# Patient Record
Sex: FEMALE | Race: BLACK OR AFRICAN AMERICAN | NOT HISPANIC OR LATINO | Employment: FULL TIME | URBAN - METROPOLITAN AREA
[De-identification: names, ages, dates, MRNs, and addresses within clinical notes are randomized per-mention and may not be internally consistent; named-entity substitution may affect disease eponyms.]

---

## 2024-06-07 ENCOUNTER — APPOINTMENT (OUTPATIENT)
Dept: PRIMARY CARE | Facility: CLINIC | Age: 24
End: 2024-06-07

## 2024-06-28 ENCOUNTER — APPOINTMENT (OUTPATIENT)
Dept: OBSTETRICS AND GYNECOLOGY | Facility: CLINIC | Age: 24
End: 2024-06-28
Payer: COMMERCIAL

## 2024-10-09 ASSESSMENT — ENCOUNTER SYMPTOMS
HEMATURIA: 0
FLATUS: 0
BELCHING: 1
FREQUENCY: 1
FEVER: 0
VOMITING: 0
ABDOMINAL PAIN: 1
DYSURIA: 0
HEADACHES: 1
ARTHRALGIAS: 1
DIARRHEA: 0
WEIGHT LOSS: 0
NAUSEA: 1
CONSTIPATION: 0
MYALGIAS: 0
HEMATOCHEZIA: 0
ANOREXIA: 1

## 2024-10-10 ENCOUNTER — HOSPITAL ENCOUNTER (EMERGENCY)
Facility: HOSPITAL | Age: 24
Discharge: HOME | End: 2024-10-10
Payer: MEDICAID

## 2024-10-10 ENCOUNTER — APPOINTMENT (OUTPATIENT)
Dept: RADIOLOGY | Facility: HOSPITAL | Age: 24
End: 2024-10-10
Payer: MEDICAID

## 2024-10-10 VITALS
OXYGEN SATURATION: 98 % | TEMPERATURE: 98.2 F | HEART RATE: 85 BPM | HEIGHT: 62 IN | DIASTOLIC BLOOD PRESSURE: 80 MMHG | WEIGHT: 145 LBS | SYSTOLIC BLOOD PRESSURE: 119 MMHG | RESPIRATION RATE: 16 BRPM | BODY MASS INDEX: 26.68 KG/M2

## 2024-10-10 DIAGNOSIS — M54.6 ACUTE LEFT-SIDED THORACIC BACK PAIN: Primary | ICD-10-CM

## 2024-10-10 LAB — PREGNANCY TEST URINE, POC: NEGATIVE

## 2024-10-10 PROCEDURE — 73030 X-RAY EXAM OF SHOULDER: CPT | Mod: LT

## 2024-10-10 PROCEDURE — 2500000001 HC RX 250 WO HCPCS SELF ADMINISTERED DRUGS (ALT 637 FOR MEDICARE OP): Mod: SE

## 2024-10-10 PROCEDURE — 99284 EMERGENCY DEPT VISIT MOD MDM: CPT

## 2024-10-10 PROCEDURE — 2500000005 HC RX 250 GENERAL PHARMACY W/O HCPCS: Mod: SE

## 2024-10-10 PROCEDURE — 72072 X-RAY EXAM THORAC SPINE 3VWS: CPT

## 2024-10-10 PROCEDURE — 73030 X-RAY EXAM OF SHOULDER: CPT | Mod: LEFT SIDE | Performed by: RADIOLOGY

## 2024-10-10 PROCEDURE — 72072 X-RAY EXAM THORAC SPINE 3VWS: CPT | Mod: FOREIGN READ | Performed by: RADIOLOGY

## 2024-10-10 PROCEDURE — 81025 URINE PREGNANCY TEST: CPT

## 2024-10-10 RX ORDER — METHOCARBAMOL 500 MG/1
1000 TABLET, FILM COATED ORAL ONCE
Status: COMPLETED | OUTPATIENT
Start: 2024-10-10 | End: 2024-10-10

## 2024-10-10 RX ORDER — METHOCARBAMOL 500 MG/1
500 TABLET, FILM COATED ORAL 3 TIMES DAILY
Qty: 9 TABLET | Refills: 0 | Status: SHIPPED | OUTPATIENT
Start: 2024-10-10 | End: 2024-10-22 | Stop reason: WASHOUT

## 2024-10-10 RX ORDER — ACETAMINOPHEN 325 MG/1
650 TABLET ORAL EVERY 6 HOURS PRN
Qty: 20 TABLET | Refills: 0 | Status: SHIPPED | OUTPATIENT
Start: 2024-10-10 | End: 2024-10-22 | Stop reason: WASHOUT

## 2024-10-10 RX ORDER — LIDOCAINE 560 MG/1
1 PATCH PERCUTANEOUS; TOPICAL; TRANSDERMAL ONCE
Status: DISCONTINUED | OUTPATIENT
Start: 2024-10-10 | End: 2024-10-10 | Stop reason: HOSPADM

## 2024-10-10 RX ORDER — IBUPROFEN 400 MG/1
400 TABLET ORAL ONCE
Status: COMPLETED | OUTPATIENT
Start: 2024-10-10 | End: 2024-10-10

## 2024-10-10 RX ORDER — IBUPROFEN 600 MG/1
600 TABLET ORAL EVERY 6 HOURS PRN
Qty: 16 TABLET | Refills: 0 | Status: SHIPPED | OUTPATIENT
Start: 2024-10-10 | End: 2024-10-14

## 2024-10-10 RX ORDER — ACETAMINOPHEN 325 MG/1
975 TABLET ORAL ONCE
Status: COMPLETED | OUTPATIENT
Start: 2024-10-10 | End: 2024-10-10

## 2024-10-10 RX ADMIN — METHOCARBAMOL 1000 MG: 500 TABLET ORAL at 09:59

## 2024-10-10 RX ADMIN — IBUPROFEN 400 MG: 400 TABLET ORAL at 09:59

## 2024-10-10 RX ADMIN — ACETAMINOPHEN 975 MG: 325 TABLET ORAL at 09:59

## 2024-10-10 RX ADMIN — LIDOCAINE 1 PATCH: 4 PATCH TOPICAL at 10:01

## 2024-10-10 ASSESSMENT — PAIN SCALES - GENERAL
PAINLEVEL_OUTOF10: 6
PAINLEVEL_OUTOF10: 8
PAINLEVEL_OUTOF10: 8
PAINLEVEL_OUTOF10: 7

## 2024-10-10 ASSESSMENT — PAIN DESCRIPTION - ORIENTATION: ORIENTATION: LEFT;UPPER

## 2024-10-10 ASSESSMENT — PAIN DESCRIPTION - LOCATION: LOCATION: BACK

## 2024-10-10 ASSESSMENT — PAIN - FUNCTIONAL ASSESSMENT
PAIN_FUNCTIONAL_ASSESSMENT: 0-10
PAIN_FUNCTIONAL_ASSESSMENT: 0-10

## 2024-10-10 ASSESSMENT — COLUMBIA-SUICIDE SEVERITY RATING SCALE - C-SSRS
6. HAVE YOU EVER DONE ANYTHING, STARTED TO DO ANYTHING, OR PREPARED TO DO ANYTHING TO END YOUR LIFE?: NO
1. IN THE PAST MONTH, HAVE YOU WISHED YOU WERE DEAD OR WISHED YOU COULD GO TO SLEEP AND NOT WAKE UP?: NO
2. HAVE YOU ACTUALLY HAD ANY THOUGHTS OF KILLING YOURSELF?: NO

## 2024-10-10 ASSESSMENT — PAIN DESCRIPTION - FREQUENCY: FREQUENCY: CONSTANT/CONTINUOUS

## 2024-10-10 ASSESSMENT — PAIN DESCRIPTION - PROGRESSION: CLINICAL_PROGRESSION: GRADUALLY WORSENING

## 2024-10-10 ASSESSMENT — PAIN DESCRIPTION - DESCRIPTORS: DESCRIPTORS: ACHING
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
## 2024-10-11 ENCOUNTER — OFFICE VISIT (OUTPATIENT)
Dept: OBSTETRICS AND GYNECOLOGY | Facility: CLINIC | Age: 24
End: 2024-10-11
Payer: MEDICAID

## 2024-10-11 VITALS
HEIGHT: 62 IN | HEART RATE: 97 BPM | DIASTOLIC BLOOD PRESSURE: 76 MMHG | BODY MASS INDEX: 28.01 KG/M2 | WEIGHT: 152.2 LBS | SYSTOLIC BLOOD PRESSURE: 116 MMHG

## 2024-10-11 DIAGNOSIS — D69.9 BLEEDING DISORDER (CMS-HCC): ICD-10-CM

## 2024-10-11 DIAGNOSIS — N94.6 DYSMENORRHEA: ICD-10-CM

## 2024-10-11 DIAGNOSIS — N92.1 MENORRHAGIA WITH IRREGULAR CYCLE: Primary | ICD-10-CM

## 2024-10-11 PROCEDURE — RXMED WILLOW AMBULATORY MEDICATION CHARGE

## 2024-10-11 PROCEDURE — 99213 OFFICE O/P EST LOW 20 MIN: CPT | Performed by: OBSTETRICS & GYNECOLOGY

## 2024-10-11 RX ORDER — FLUTICASONE PROPIONATE 50 MCG
1 SPRAY, SUSPENSION (ML) NASAL DAILY
COMMUNITY
Start: 2021-07-07

## 2024-10-11 RX ORDER — NORETHINDRONE 5 MG/1
5 TABLET ORAL DAILY
Qty: 90 TABLET | Refills: 0 | Status: SHIPPED | OUTPATIENT
Start: 2024-10-11 | End: 2025-01-15

## 2024-10-11 ASSESSMENT — ENCOUNTER SYMPTOMS
ENDOCRINE NEGATIVE: 0
ALLERGIC/IMMUNOLOGIC NEGATIVE: 0
RESPIRATORY NEGATIVE: 0
HEMATOLOGIC/LYMPHATIC NEGATIVE: 0
MUSCULOSKELETAL NEGATIVE: 0
PSYCHIATRIC NEGATIVE: 0
NEUROLOGICAL NEGATIVE: 0
EYES NEGATIVE: 0
CONSTITUTIONAL NEGATIVE: 0
GASTROINTESTINAL NEGATIVE: 0
CARDIOVASCULAR NEGATIVE: 0

## 2024-10-11 ASSESSMENT — PATIENT HEALTH QUESTIONNAIRE - PHQ9
1. LITTLE INTEREST OR PLEASURE IN DOING THINGS: NOT AT ALL
SUM OF ALL RESPONSES TO PHQ9 QUESTIONS 1 AND 2: 1
2. FEELING DOWN, DEPRESSED OR HOPELESS: SEVERAL DAYS
10. IF YOU CHECKED OFF ANY PROBLEMS, HOW DIFFICULT HAVE THESE PROBLEMS MADE IT FOR YOU TO DO YOUR WORK, TAKE CARE OF THINGS AT HOME, OR GET ALONG WITH OTHER PEOPLE: NOT DIFFICULT AT ALL

## 2024-10-11 ASSESSMENT — PAIN SCALES - GENERAL: PAINLEVEL: 3

## 2024-10-11 NOTE — PROGRESS NOTES
"        Clifford Mandujano presents today with:   Dysmenorrhea  Discussed potential diagnoses, including endometriosis  Reviewed pathophysiology of condition, how we diagnose treatment options, indications for surgery  Deisres to use norethindrone acetate now  We discussed Orilissa as well, declines at this time  Referral to pelvic floor PT  [facility]    Heavy menstrual bleeding   Workup for bleeding disorder due to having always had heavy periods          Prep time on date of patient encounter:  2  Time spent directly with patient/family/caregiver:  30  Coordination of care, ordering of labs, prescriptions:  5  Documentation time: 3  Total time on date of patient encounter: 40          -----------------------------------------------------------------------  HPI    Presents today with  Concern about having fibroids - has strong family history;   When was at school in [place], had CT scan which suggested fibroids  also with Painful periods, getting worse  Every since menarche, cramping, throwing up, can't move  Heating pads, exercising during, pain pills, midol, tylenol, advil, aleve  Even tried taking  Periods were predictable, now more irregular   Was prescribed birth control pills, took during college  Had spotting, still had pain  Started on patch, stopped taking last month - didn't feel a change in periods  Now has Pain all the time    Heavy bleeding - wearing large pads/diapers to avoid bleeding through    No pain with bowel movements  Not sexually active        Visit Vitals  /76   Pulse 97   Ht 1.575 m (5' 2\")   Wt 69 kg (152 lb 3.2 oz)   LMP 10/05/2024   BMI 27.84 kg/m²   OB Status Having periods   Smoking Status Never   BSA 1.74 m²       Physical Exam  Constitutional:       Appearance: Normal appearance.   HENT:      Head: Normocephalic and atraumatic.   Pulmonary:      Effort: Pulmonary effort is normal.   Musculoskeletal:      Cervical back: Neck supple.   Neurological:      " Mental Status: She is alert.   Psychiatric:         Mood and Affect: Mood normal.       Declined pelvic exam today

## 2024-10-17 ENCOUNTER — APPOINTMENT (OUTPATIENT)
Dept: RADIOLOGY | Facility: HOSPITAL | Age: 24
End: 2024-10-17
Payer: COMMERCIAL

## 2024-10-17 ENCOUNTER — APPOINTMENT (OUTPATIENT)
Dept: RADIOLOGY | Facility: HOSPITAL | Age: 24
End: 2024-10-17

## 2024-10-17 ENCOUNTER — PHARMACY VISIT (OUTPATIENT)
Dept: PHARMACY | Facility: CLINIC | Age: 24
End: 2024-10-17
Payer: MEDICARE

## 2024-10-22 ENCOUNTER — APPOINTMENT (OUTPATIENT)
Dept: PRIMARY CARE | Facility: CLINIC | Age: 24
End: 2024-10-22
Payer: MEDICAID

## 2024-10-22 VITALS
DIASTOLIC BLOOD PRESSURE: 80 MMHG | WEIGHT: 155 LBS | BODY MASS INDEX: 28.52 KG/M2 | OXYGEN SATURATION: 100 % | HEIGHT: 62 IN | SYSTOLIC BLOOD PRESSURE: 122 MMHG | HEART RATE: 88 BPM | TEMPERATURE: 97 F
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
## 2024-10-22 DIAGNOSIS — J30.2 SEASONAL ALLERGIES: Primary | ICD-10-CM

## 2024-10-22 DIAGNOSIS — J45.20 MILD INTERMITTENT ASTHMA, UNSPECIFIED WHETHER COMPLICATED (HHS-HCC): ICD-10-CM

## 2024-10-22 PROCEDURE — 3008F BODY MASS INDEX DOCD: CPT

## 2024-10-22 PROCEDURE — 99203 OFFICE O/P NEW LOW 30 MIN: CPT

## 2024-10-22 PROCEDURE — RXMED WILLOW AMBULATORY MEDICATION CHARGE

## 2024-10-22 RX ORDER — CETIRIZINE HYDROCHLORIDE 10 MG/1
10 TABLET ORAL DAILY
Qty: 30 TABLET | Refills: 2 | Status: SHIPPED | OUTPATIENT
Start: 2024-10-22 | End: 2025-01-20

## 2024-10-22 RX ORDER — ALBUTEROL SULFATE 90 UG/1
2 INHALANT RESPIRATORY (INHALATION) EVERY 4 HOURS PRN
Qty: 8.5 G | Refills: 5 | Status: SHIPPED | OUTPATIENT
Start: 2024-10-22 | End: 2025-10-22

## 2024-10-22 RX ORDER — FLUTICASONE PROPIONATE 50 MCG
1 SPRAY, SUSPENSION (ML) NASAL DAILY
Qty: 16 G | Refills: 5 | Status: SHIPPED | OUTPATIENT
Start: 2024-10-22 | End: 2025-10-22

## 2024-10-22 ASSESSMENT — PAIN SCALES - GENERAL: PAINLEVEL_OUTOF10: 0-NO PAIN

## 2024-10-22 ASSESSMENT — PATIENT HEALTH QUESTIONNAIRE - PHQ9
1. LITTLE INTEREST OR PLEASURE IN DOING THINGS: NOT AT ALL
SUM OF ALL RESPONSES TO PHQ9 QUESTIONS 1 AND 2: 0
2. FEELING DOWN, DEPRESSED OR HOPELESS: NOT AT ALL
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
## 2024-10-24 PROBLEM — J30.2 SEASONAL ALLERGIES: Status: ACTIVE | Noted: 2024-10-24

## 2024-10-29 ENCOUNTER — HOSPITAL ENCOUNTER (OUTPATIENT)
Dept: RADIOLOGY | Facility: HOSPITAL | Age: 24
Discharge: HOME | End: 2024-10-29
Payer: MEDICAID

## 2024-10-29 DIAGNOSIS — N92.1 MENORRHAGIA WITH IRREGULAR CYCLE: ICD-10-CM

## 2024-10-29 PROCEDURE — 76856 US EXAM PELVIC COMPLETE: CPT

## 2024-10-29 PROCEDURE — 76830 TRANSVAGINAL US NON-OB: CPT | Performed by: STUDENT IN AN ORGANIZED HEALTH CARE EDUCATION/TRAINING PROGRAM

## 2024-10-29 PROCEDURE — 76856 US EXAM PELVIC COMPLETE: CPT | Performed by: STUDENT IN AN ORGANIZED HEALTH CARE EDUCATION/TRAINING PROGRAM

## 2024-10-30 ENCOUNTER — LAB (OUTPATIENT)
Dept: LAB | Facility: LAB | Age: 24
End: 2024-10-30
Payer: MEDICAID

## 2024-10-30 DIAGNOSIS — D69.9 BLEEDING DISORDER (CMS-HCC): ICD-10-CM

## 2024-10-30 LAB
APTT PPP: 28 SECONDS (ref 27–38)
ERYTHROCYTE [DISTWIDTH] IN BLOOD BY AUTOMATED COUNT: 17.6 % (ref 11.5–14.5)
FIBRINOGEN PPP-MCNC: 341 MG/DL (ref 200–400)
HCT VFR BLD AUTO: 29.3 % (ref 36–46)
HGB BLD-MCNC: 9 G/DL (ref 12–16)
INR PPP: 1 (ref 0.9–1.1)
MCH RBC QN AUTO: 24.3 PG (ref 26–34)
MCHC RBC AUTO-ENTMCNC: 30.7 G/DL (ref 32–36)
MCV RBC AUTO: 79 FL (ref 80–100)
NRBC BLD-RTO: 0 /100 WBCS (ref 0–0)
PLATELET # BLD AUTO: 400 X10*3/UL (ref 150–450)
PROTHROMBIN TIME: 11.5 SECONDS (ref 9.8–12.8)
RBC # BLD AUTO: 3.7 X10*6/UL (ref 4–5.2)
WBC # BLD AUTO: 5.5 X10*3/UL (ref 4.4–11.3)

## 2024-10-30 PROCEDURE — 85610 PROTHROMBIN TIME: CPT

## 2024-10-30 PROCEDURE — 85730 THROMBOPLASTIN TIME PARTIAL: CPT

## 2024-10-30 PROCEDURE — 36415 COLL VENOUS BLD VENIPUNCTURE: CPT

## 2024-10-30 PROCEDURE — 85384 FIBRINOGEN ACTIVITY: CPT

## 2024-10-30 PROCEDURE — 85027 COMPLETE CBC AUTOMATED: CPT

## 2024-11-01 ENCOUNTER — PHARMACY VISIT (OUTPATIENT)
Dept: PHARMACY | Facility: CLINIC | Age: 24
End: 2024-11-01
Payer: MEDICAID

## 2024-11-13 ENCOUNTER — APPOINTMENT (OUTPATIENT)
Dept: URGENT CARE | Age: 24
End: 2024-11-13
Payer: COMMERCIAL

## 2024-11-19 ENCOUNTER — HOSPITAL ENCOUNTER (EMERGENCY)
Facility: HOSPITAL | Age: 24
Discharge: HOME | End: 2024-11-19
Payer: MEDICAID

## 2024-11-19 ENCOUNTER — APPOINTMENT (OUTPATIENT)
Dept: RADIOLOGY | Facility: HOSPITAL | Age: 24
End: 2024-11-19
Payer: MEDICAID

## 2024-11-19 VITALS
HEART RATE: 99 BPM | RESPIRATION RATE: 20 BRPM | BODY MASS INDEX: 27.6 KG/M2 | HEIGHT: 62 IN | DIASTOLIC BLOOD PRESSURE: 74 MMHG | SYSTOLIC BLOOD PRESSURE: 119 MMHG | TEMPERATURE: 98.2 F | WEIGHT: 150 LBS | OXYGEN SATURATION: 99 %

## 2024-11-19 DIAGNOSIS — D21.9 FIBROIDS: ICD-10-CM

## 2024-11-19 DIAGNOSIS — R10.9 ABDOMINAL PAIN, UNSPECIFIED ABDOMINAL LOCATION: Primary | ICD-10-CM

## 2024-11-19 LAB
ALBUMIN SERPL BCP-MCNC: 4.2 G/DL (ref 3.4–5)
ALP SERPL-CCNC: 47 U/L (ref 33–110)
ALT SERPL W P-5'-P-CCNC: 6 U/L (ref 7–45)
ANION GAP SERPL CALC-SCNC: 12 MMOL/L (ref 10–20)
AST SERPL W P-5'-P-CCNC: 11 U/L (ref 9–39)
BASOPHILS # BLD AUTO: 0.03 X10*3/UL (ref 0–0.1)
BASOPHILS NFR BLD AUTO: 0.5 %
BILIRUB SERPL-MCNC: 0.4 MG/DL (ref 0–1.2)
BUN SERPL-MCNC: 8 MG/DL (ref 6–23)
CALCIUM SERPL-MCNC: 9.5 MG/DL (ref 8.6–10.6)
CHLORIDE SERPL-SCNC: 106 MMOL/L (ref 98–107)
CO2 SERPL-SCNC: 25 MMOL/L (ref 21–32)
CREAT SERPL-MCNC: 0.86 MG/DL (ref 0.5–1.05)
EGFRCR SERPLBLD CKD-EPI 2021: >90 ML/MIN/1.73M*2
EOSINOPHIL # BLD AUTO: 0.02 X10*3/UL (ref 0–0.7)
EOSINOPHIL NFR BLD AUTO: 0.3 %
ERYTHROCYTE [DISTWIDTH] IN BLOOD BY AUTOMATED COUNT: 17 % (ref 11.5–14.5)
GLUCOSE SERPL-MCNC: 82 MG/DL (ref 74–99)
HCT VFR BLD AUTO: 30.1 % (ref 36–46)
HGB BLD-MCNC: 9.8 G/DL (ref 12–16)
IMM GRANULOCYTES # BLD AUTO: 0.02 X10*3/UL (ref 0–0.7)
IMM GRANULOCYTES NFR BLD AUTO: 0.3 % (ref 0–0.9)
LIPASE SERPL-CCNC: 15 U/L (ref 9–82)
LYMPHOCYTES # BLD AUTO: 1.34 X10*3/UL (ref 1.2–4.8)
LYMPHOCYTES NFR BLD AUTO: 20.4 %
MCH RBC QN AUTO: 24.3 PG (ref 26–34)
MCHC RBC AUTO-ENTMCNC: 32.6 G/DL (ref 32–36)
MCV RBC AUTO: 75 FL (ref 80–100)
MONOCYTES # BLD AUTO: 0.71 X10*3/UL (ref 0.1–1)
MONOCYTES NFR BLD AUTO: 10.8 %
NEUTROPHILS # BLD AUTO: 4.45 X10*3/UL (ref 1.2–7.7)
NEUTROPHILS NFR BLD AUTO: 67.7 %
NRBC BLD-RTO: 0 /100 WBCS (ref 0–0)
PLATELET # BLD AUTO: 344 X10*3/UL (ref 150–450)
POTASSIUM SERPL-SCNC: 4.4 MMOL/L (ref 3.5–5.3)
PREGNANCY TEST URINE, POC: NEGATIVE
PROT SERPL-MCNC: 7.8 G/DL (ref 6.4–8.2)
RBC # BLD AUTO: 4.03 X10*6/UL (ref 4–5.2)
SODIUM SERPL-SCNC: 139 MMOL/L (ref 136–145)
WBC # BLD AUTO: 6.6 X10*3/UL (ref 4.4–11.3)

## 2024-11-19 PROCEDURE — 99285 EMERGENCY DEPT VISIT HI MDM: CPT | Mod: 25

## 2024-11-19 PROCEDURE — 36415 COLL VENOUS BLD VENIPUNCTURE: CPT | Performed by: PHYSICIAN ASSISTANT

## 2024-11-19 PROCEDURE — 76856 US EXAM PELVIC COMPLETE: CPT | Performed by: STUDENT IN AN ORGANIZED HEALTH CARE EDUCATION/TRAINING PROGRAM

## 2024-11-19 PROCEDURE — 74177 CT ABD & PELVIS W/CONTRAST: CPT

## 2024-11-19 PROCEDURE — 96376 TX/PRO/DX INJ SAME DRUG ADON: CPT

## 2024-11-19 PROCEDURE — 76856 US EXAM PELVIC COMPLETE: CPT

## 2024-11-19 PROCEDURE — 83690 ASSAY OF LIPASE: CPT | Performed by: PHYSICIAN ASSISTANT

## 2024-11-19 PROCEDURE — 80053 COMPREHEN METABOLIC PANEL: CPT | Performed by: PHYSICIAN ASSISTANT

## 2024-11-19 PROCEDURE — 2500000004 HC RX 250 GENERAL PHARMACY W/ HCPCS (ALT 636 FOR OP/ED): Mod: SE | Performed by: PHYSICIAN ASSISTANT

## 2024-11-19 PROCEDURE — 2550000001 HC RX 255 CONTRASTS: Mod: SE | Performed by: PHYSICIAN ASSISTANT

## 2024-11-19 PROCEDURE — 85025 COMPLETE CBC W/AUTO DIFF WBC: CPT | Performed by: PHYSICIAN ASSISTANT

## 2024-11-19 PROCEDURE — 81025 URINE PREGNANCY TEST: CPT | Performed by: PHYSICIAN ASSISTANT

## 2024-11-19 PROCEDURE — 96374 THER/PROPH/DIAG INJ IV PUSH: CPT | Mod: 59

## 2024-11-19 PROCEDURE — 99285 EMERGENCY DEPT VISIT HI MDM: CPT | Performed by: PHYSICIAN ASSISTANT

## 2024-11-19 PROCEDURE — 74177 CT ABD & PELVIS W/CONTRAST: CPT | Performed by: RADIOLOGY

## 2024-11-19 PROCEDURE — RXMED WILLOW AMBULATORY MEDICATION CHARGE

## 2024-11-19 RX ORDER — NAPROXEN 500 MG/1
500 TABLET ORAL 2 TIMES DAILY
Qty: 20 TABLET | Refills: 0 | Status: SHIPPED | OUTPATIENT
Start: 2024-11-19

## 2024-11-19 RX ORDER — KETOROLAC TROMETHAMINE 10 MG/1
10 TABLET, FILM COATED ORAL EVERY 6 HOURS PRN
Qty: 12 TABLET | Refills: 0 | Status: SHIPPED | OUTPATIENT
Start: 2024-11-19 | End: 2024-11-22

## 2024-11-19 RX ORDER — KETOROLAC TROMETHAMINE 15 MG/ML
15 INJECTION, SOLUTION INTRAMUSCULAR; INTRAVENOUS ONCE
Status: COMPLETED | OUTPATIENT
Start: 2024-11-19 | End: 2024-11-19

## 2024-11-19 RX ADMIN — KETOROLAC TROMETHAMINE 15 MG: 15 INJECTION, SOLUTION INTRAMUSCULAR; INTRAVENOUS at 15:31

## 2024-11-19 RX ADMIN — IOHEXOL 90 ML: 350 INJECTION, SOLUTION INTRAVENOUS at 10:43

## 2024-11-19 RX ADMIN — KETOROLAC TROMETHAMINE 15 MG: 15 INJECTION, SOLUTION INTRAMUSCULAR; INTRAVENOUS at 10:26

## 2024-11-19 ASSESSMENT — PAIN DESCRIPTION - LOCATION: LOCATION: ABDOMEN

## 2024-11-19 ASSESSMENT — PAIN SCALES - GENERAL: PAINLEVEL_OUTOF10: 8

## 2024-11-19 ASSESSMENT — PAIN DESCRIPTION - ORIENTATION: ORIENTATION: RIGHT;LEFT;LOWER

## 2024-11-19 ASSESSMENT — LIFESTYLE VARIABLES
EVER FELT BAD OR GUILTY ABOUT YOUR DRINKING: NO
HAVE PEOPLE ANNOYED YOU BY CRITICIZING YOUR DRINKING: NO
TOTAL SCORE: 0
EVER HAD A DRINK FIRST THING IN THE MORNING TO STEADY YOUR NERVES TO GET RID OF A HANGOVER: NO
HAVE YOU EVER FELT YOU SHOULD CUT DOWN ON YOUR DRINKING: NO

## 2024-11-19 ASSESSMENT — COLUMBIA-SUICIDE SEVERITY RATING SCALE - C-SSRS
2. HAVE YOU ACTUALLY HAD ANY THOUGHTS OF KILLING YOURSELF?: NO
1. IN THE PAST MONTH, HAVE YOU WISHED YOU WERE DEAD OR WISHED YOU COULD GO TO SLEEP AND NOT WAKE UP?: NO
6. HAVE YOU EVER DONE ANYTHING, STARTED TO DO ANYTHING, OR PREPARED TO DO ANYTHING TO END YOUR LIFE?: NO

## 2024-11-19 ASSESSMENT — PAIN - FUNCTIONAL ASSESSMENT: PAIN_FUNCTIONAL_ASSESSMENT: 0-10

## 2024-11-19 NOTE — ED TRIAGE NOTES
Pt states abdominal pain since Saturday. States took tylenol for it but it did not help. Pt denies N/V/D. Pt states doesn's tknow when her last menstrual cycle was since she is on birth control.

## 2024-11-19 NOTE — PROGRESS NOTES
Patient is a 24-year-old female who presents to the ED for lower abdominal pain that has been ongoing for the past 3 days.  CT showed concern for uterine fibroids.  Patient was signed out to me pending ultrasound reading.  Patient unfortunately had to repeat leave prior to ultrasound read coming back.  States that she does have a follow-up with OB/GYN scheduled in a week which she was advised to keep. Provided a prescription for Toradol.  Suspect that her pain is likely due to uterine fibroids.  Advised to return to the ED with any concerns. As a result of the work-up, the patient was discharged home in stable condition.  They were informed of the diagnosis and instructed to come back with any concerns or worsening of condition.  Agreeable to the plan as discussed above.  Patient given the opportunity to ask questions.  All of the patient's questions were answered.       Batool Blanco PA-C

## 2024-11-19 NOTE — DISCHARGE INSTRUCTIONS
Your imaging shows several large fibroids, this is most likely the cause of your pain.  Take the naproxen for pain relief.  Follow-up with OB/GYN, if you have not heard from them call the number below

## 2024-11-19 NOTE — ED PROVIDER NOTES
Emergency Department Provider Note        History of Present Illness     24-year-old female otherwise healthy presents complaining of abdominal pain x 3 days.  States that it came out of nowhere.  Denies any recent travel or sick contacts.  Denies any abnormal ingestions.  Denies any nausea or vomiting.  Points to her lower abdomen as the location of the pain.  States she is on birth control pill and is not concerned for pregnancy.  Denies any urinary complaints including dysuria frequency urgency.  Is not concerned for STDs.  Denies any fevers chills night sweats or rigors.    External Records Reviewed including ED notes, H&P, Discharge Summary, outpatient PCP/specialist notes.  Physical Exam     Triage Vitals: T 36.8 °C (98.2 °F)  HR 99  /74  RR 20  O2 99 % None (Room air)  GEN: NAD  EYES:  EOMs grossly intact, anicteric sclera  OZIEL: Mucosa moist.  NECK: Supple.  CARD: RRR  PULMONARY: Moving air well. Clear all lung fields.  ABDOMEN: Soft, no guarding, no rigidity.  Diffusely moderately tender in the lower abdomen poorly localized including right and lower abdomen. NABS  EXTREMITIES: Full ROM, no pitting edema,   SKIN: Intact, warm and dry  NEURO: Alert and oriented x 3, speech is clear, no obvious deficits noted.         Medical Decision Making & ED Course     24-year-old female presenting for lower abdominal pain x 3 days.  On exam she is nontoxic ambulating without difficulty with some signs of lower abdominal discomfort.  Vital signs stable including afebrile with no tachycardia hypoxia or hypotension.  ABD soft without rigidity or guarding, diffuse tenderness in the lower abdomen, NABS.  Will check urine pregnancy and if negative will perform lab work and CT A/P.    ED Course as of 11/19/24 1457   Tue Nov 19, 2024   1115 CT abdomen pelvis w IV contrast  CT A/P with uterine fibroids, no surgical pathology identified, will send her for transvaginal ultrasound [DIANELYS]   1116 Laboratory work with normal  electrolytes, no leukocytosis, mild anemia with H&H 9.8/30.1, normal lipase [DIANELYS]   1456 CBC with mild anemia H&H 9.8/30.1, no leukocytosis, metabolic panel with normal electrolytes [DIANELYS]   1456 At the end of my shift awaiting ultrasound read.  Signout given to oncoming GIORGI Francis [DIANELYS]      ED Course User Index  [DIANELYS] Isiah Mitchell PA-C         Diagnoses as of 11/19/24 1457   Abdominal pain, unspecified abdominal location   Fibroids     CT abdomen pelvis w IV contrast   Final Result   1. Heterogeneous and enlarged uterus with multiple soft tissue masses   compatible with leiomyomas, there is a cystic mass of the inferior   uterus which may represent a subacute degenerating leiomyoma, further   evaluation with pelvic ultrasound is recommended. No etiology for   lower abdominal tendinous is otherwise evident.   2. Trace amount of pelvic free fluid, likely physiologic in etiology.        I personally reviewed the image(s) / study and I agree with the   findings as stated by So Prieto MD. This study was interpreted at   [facility], [place], [place].        MACRO:   None.        Signed by: Nicolas Lynch 11/19/2024 11:12 AM   Dictation workstation:   [id number]      US pelvis    (Results Pending)     Labs Reviewed   CBC WITH AUTO DIFFERENTIAL - Abnormal       Result Value    WBC 6.6      nRBC 0.0      RBC 4.03      Hemoglobin 9.8 (*)     Hematocrit 30.1 (*)     MCV 75 (*)     MCH 24.3 (*)     MCHC 32.6      RDW 17.0 (*)     Platelets 344      Neutrophils % 67.7      Immature Granulocytes %, Automated 0.3      Lymphocytes % 20.4      Monocytes % 10.8      Eosinophils % 0.3      Basophils % 0.5      Neutrophils Absolute 4.45      Immature Granulocytes Absolute, Automated 0.02      Lymphocytes Absolute 1.34      Monocytes Absolute 0.71      Eosinophils Absolute 0.02      Basophils Absolute 0.03     COMPREHENSIVE METABOLIC PANEL - Abnormal    Glucose 82      Sodium 139      Potassium 4.4       Chloride 106      Bicarbonate 25      Anion Gap 12      Urea Nitrogen 8      Creatinine 0.86      eGFR >90      Calcium 9.5      Albumin 4.2      Alkaline Phosphatase 47      Total Protein 7.8      AST 11      Bilirubin, Total 0.4      ALT 6 (*)    LIPASE - Normal    Lipase 15      Narrative:     Venipuncture immediately after or during the administration of Metamizole may lead to falsely low results. Testing should be performed immediately prior to Metamizole dosing.   POCT PREGNANCY, URINE - Normal    Preg Test, Ur Negative     URINALYSIS WITH REFLEX CULTURE AND MICROSCOPIC    Narrative:     The following orders were created for panel order Urinalysis with Reflex Culture and Microscopic.  Procedure                               Abnormality         Status                     ---------                               -----------         ------                     Urinalysis with Reflex C...[[id number]]                                                 Extra Urine Gray Tube[[id number]]                                                         Please view results for these tests on the individual orders.   URINALYSIS WITH REFLEX CULTURE AND MICROSCOPIC   EXTRA URINE GRAY TUBE       ----------------------------------------------------------------------------------------------------------------------------    This note was dictated using a speech recognition program.  While an attempt was made at proof reading to minimize errors, minor errors in transcription may be present call for questions.     Isiah Mitchell PA-C  11/19/24 0812

## 2024-11-19 NOTE — Clinical Note
Clifford Mandujano was seen and treated in our emergency department on 11/19/2024.  She may return to work on 11/20/2024.       If you have any questions or concerns, please don't hesitate to call.      Isiah Mitchell PA-C

## 2024-11-20 ENCOUNTER — PHARMACY VISIT (OUTPATIENT)
Dept: PHARMACY | Facility: CLINIC | Age: 24
End: 2024-11-20
Payer: MEDICAID

## 2024-11-21 NOTE — PROGRESS NOTES
Division of Minimally Invasive Gynecologic Surgery  [facility]    11/25/24 Gynecology Visit    CC:   Chief Complaint   Patient presents with    Follow-up     Patient is here for painful periods   4/10 abdomen pain  No falls   STD declined   LMP unknown patient been spotting   Last pap 2023       Clifford Mandujano is a 24 y.o. referred to our practice by Dr. Sequeira    Pt reports irregular menses since menarche. When she did have a period menses would last up to a week but typically lasting 3-5 days.  Reports painful periods since menarche with severe cramping and nausea. Uses heating pads. Midol, tylenol withotu relief. Has tried OCPs and patch previously without improvement.  Since the past year she is in near daily discomfort and periods are now longer lasting 7 days. Has to wear diapers on heavy days because she would soak through a tampon/pad.   She was started on norethindrone at her visit with Dr. Sequeira on 10/11/24. Pain has been better on the norethindrone until Saturday when she went to the ED for abdominal pain.   PFPT- scheduled to start 12/2    GI symptoms: none  Urinary symptoms: none  Sexual activity: not currently. Has pain with all aspects of sex. States she thinks she has vaginismus and has spasms with initial penetration    Prior Therapies: OCPs, patch    Imaging:   --TVUS:  uterus measures  8.8 cm x 9.6 cm x 11.3 cm. Multiple  heterogeneous, hypoechoic uterine lesions are identified with  posterior shadowing most consistent with leiomyomas. Normal adnexa  --CT AP: The uterus is enlarged and heterogeneous with  multiple soft tissue masses compatible with leiomyomas, largest of  which measures 3.4 x 3.9 x 3.3 cm in the anterior uterus. A cystic  mass of the inferior uterus measures 5.9 x 5.8 x 6.0 cm (series 201,  image 107). There is displacement of the endometrial canal which is  fluid-filled.  Labs: H/H 9.8/30.1     GYN Hx  Gynecologic  history:  Contraception/menstrual regulation: norethindrone  Desires Childbearing: yes  Last pap: normal last year  Maternal grandmother with uterine cancer. No family history of breast, ovarian, colon or endometrial cancer.       OBHx: G0  Pertinent PMH: asthma, fibroids  Pertient PSH: none    PMHx, PSHx, SHx, Allergies, and Medications updated in Epic.  Past Medical History:   Diagnosis Date    Chlamydial infection, unspecified 08/03/2020    Chlamydia    Dysmenorrhea, unspecified 10/11/2018    Adolescent dysmenorrhea    Encounter for immunization     Immunization due    Encounter for other specified special examinations     Diagnostic skin test    Encounter for routine child health examination with abnormal findings 08/08/2018    Encounter for routine child health examination with abnormal findings    Encounter for surveillance of contraceptive pills 01/31/2021    Encounter for surveillance of contraceptive pills    Hypermetropia, right eye 05/29/2019    Hypermetropia of right eye    Other conditions influencing health status     Full-term infant    Other diseases of vocal cords     Vocal cord dysfunction    Personal history of diseases of the skin and subcutaneous tissue 06/28/2018    History of acne    Personal history of other diseases of the nervous system and sense organs     History of migraine    Personal history of other endocrine, nutritional and metabolic disease 05/06/2015    History of vitamin D deficiency    Personal history of other infectious and parasitic diseases 08/07/2018    History of candidiasis of vagina    Personal history of other specified conditions 04/29/2020    History of epistaxis    Personal history of other specified conditions 03/07/2016    History of headache    Unspecified asthma with (acute) exacerbation ([facility]) 11/03/2014    Asthma exacerbation     History reviewed. No pertinent surgical history.  Allergies   Allergen Reactions    Cat Dander Unknown    Dog Dander Unknown     Histamine Phosphate Unknown    Ragweed Unknown       Current Outpatient Medications:     albuterol (Ventolin HFA) 90 mcg/actuation inhaler, Inhale 2 puffs every 4 hours if needed for wheezing or shortness of breath., Disp: 8.5 g, Rfl: 5    cetirizine (ZyrTEC) 10 mg tablet, Take 1 tablet (10 mg) by mouth once daily., Disp: 30 tablet, Rfl: 2    fluticasone (Flonase) 50 mcg/actuation nasal spray, Administer 1 spray into each nostril once daily. Shake gently. Before first use, prime pump. After use, clean tip and replace cap., Disp: 16 g, Rfl: 5    naproxen (Naprosyn) 500 mg tablet, Take 1 tablet (500 mg) by mouth 2 times a day., Disp: 20 tablet, Rfl: 0    norethindrone (Aygestin) 5 mg tablet, Take 1 tablet (5 mg) by mouth once daily., Disp: 90 tablet, Rfl: 0  Social History     Socioeconomic History    Marital status: Single     Spouse name: Not on file    Number of children: Not on file    Years of education: Not on file    Highest education level: Not on file   Occupational History    Not on file   Tobacco Use    Smoking status: Never    Smokeless tobacco: Never   Vaping Use    Vaping status: Never Used   Substance and Sexual Activity    Alcohol use: Yes     Comment: social    Drug use: Never    Sexual activity: Not Currently   Other Topics Concern    Not on file   Social History Narrative    Not on file     Social Drivers of Health     Financial Resource Strain: Not on file   Food Insecurity: Not on file   Transportation Needs: Not on file   Physical Activity: Not on file   Stress: Not on file   Social Connections: Not on file   Intimate Partner Violence: Not on file   Housing Stability: Not on file     Family History   Problem Relation Name Age of Onset    Fibroids Mother          had genetic testing for Cordero syndrome which was negative    Endometrial cancer Maternal Grandmother      Stroke Maternal Grandmother      Ovarian cancer Neg Hx      Colon cancer Neg Hx       OB History          0    Para   0     Term   0       0    AB   0    Living   0         SAB   0    IAB   0    Ectopic   0    Multiple   0    Live Births   0                    ROS: reviewed and negative    PE:/79   Pulse 100   Wt 70.9 kg (156 lb 3.2 oz)   LMP 2024   BMI 28.57 kg/m²   Gen: NAD  Psych: AAOx3  Skin: no lesions  Pulm: nonlabored breathing, normal respiratory effort  Cards: normal peripheral perfusion  Lymph: no LAD in axilla or groin  GI: soft, non-tender, non-distended, no rebound/guarding, no masses  :deferred      A/P: Clifford Mandujano is a 24 y.o. with pelvic pain, fibroids and possible endometriosis vs adenomyosis    Fibroids  - Reviewed the diagnosis and pathophysiology of fibroids with the patient. Reviewed that fibroids are generally benign growths of the uterine muscle that can either be asymptomatic or symptomatic in the form of bleeding or bulk symptoms.  - Expectant management-The patient's fibroids were discussed and expectant management was offered with strict precautions.  - Hormonal management -- We reviewed hormonal methods to try and minimize bleeding. We discussed that the bulk symptoms are generally not improved with hormonal therapy and sometimes do not improve with menopause if very large, albeit the fact that fibroids generally shrink with menopause. Discussed various forms of hormonal management including OCPs, patch, ring, deop-provera, Nexplanon, and IUD. Contraindications, proper use, side effects reviewed.  - Lupron/Elagolix - Discussed the use of lupron to help with preoperative shrinkage of fibroids to maximize the change of a laparoscopic approach or minimize the need for a vertical midline incision and increase preoperative blood counts. Discussed that Lupron is not a definitive management and reviewed the side effects  - UAE-The patient was offered UAE/UFE and the procedure was discussed in detail including risks/benefits. Reviewed the UAE should not be considered if patient want to  preserve fertility  - Myomectomy - Reviewed that while a laparoscopic myomectomy is preferred, size, location and number of fibroids may impact the best surgical approach. An open approach may be preferable to maximize fertility and symptom management. Reviewed risk of malignant dissemination during myomectomy. Reviewed the need for C/S for delivery for further pregnancies. Also, though not in all cases, myomectomy is generally reserved for those wishing to pursue pregnancy in the future. Regarding risks of surgery for hysterectomy vs. myomectomy, potential risks with myomectomy include longer surgery, greater blood loss, risk of fibroid recurrence, potential need for future surgery related to fibroid symptoms, and inability to guarantee future fertility compared to hysterectomy.  - Hysterectomy - If no longer desiring fertility, definitive surgical management  was discussed with the patient. Recommended this over myomectomy in cases of no further interest in fertility for complete symptom management.   - TVUS and CT reviewed: large fibroid uterus with degenerating fibroid  - Rx toradol for degenerating fibroid  - pt desires myomectomy  -will obtain MRI for further characterization    Pelvic pain  - We  discussed the multiple etiologies of chronic pelvic pain, including endometriosis, adenomyosis, GI etiologies, MSK etiologies, and centralization of pain. We discussed the role of surgical excision in the management of endometriosis and the estimated 15-20% recurrence rate in the future. We also reviewed the association between endometriosis and adenomyosis, as well as the fact that definitive diagnosis of adenomyosis cannot be made without hysterectomy.   -Regarding endometriosis, we discussed the natural history of the disease, superficial endometriosis, endometriomas, and deeply infiltrating disease. Extensively reviewed the medical and surgical treatment options available for endometriosis including NSAIDs, OCPs,  progestin therapy (Mirena IUD, depo-provera, norethindrone), GnRH agonists, GnRH antagonists, aromatase inhibitors (i.e. Letrozole), Orilissa (Elagolix tablets), as well as the surgical options including excision of endometriosis alone, excision of endometriosis +/- Mirena IUD +/- appendectomy, and a hysterectomy with preservation of her ovaries. We reviewed at length the modern treatment of endometriosis, recommending preservation of ovaries if normal, despite the presence of extensive disease due to morbidity and mortality benefits.  - We discussed the natural history of endometriosis and the fact that hormonal suppression is thought to have a role in slowing disease progression and managing symptoms of endometriosis, but cannot definitively reverse or eliminate endometriosis.   - She has tried multiple medical approaches and has not gotten adequate relief.   - I do think surgical excision is appropriate for this patient, and she desires to proceed with an aggressive surgical approach. We did however discuss that given the likelihood of pelvic floor dysfunction and the possibility of centralization of pain, she may not experience complete relief from surgery. She is aware she may require further treatment for these etiologies of pain after surgery.   - I recommend preoperative MRI to evaluate for the presence of deep infiltrating endometriosis, with particular attention to bowel endometriosis.  - continue norethindrone    Anemia  - Hgb 9.8  - Rx iron, colace    Pelvic floor dysfunction  - The patient was counseled that pelvic pain may have many sources, and at times, pelvic floor muscle spasm can be a major contributor. The origin of pelvic floor muscle spasm can be multifactorial, including primary, reactive to a different pain source, trauma, or even part of a centralized pain syndrome. With regard to pelvic floor muscle spasm, her clinical history and exam findings support this diagnosis. Our primary  intervention is typically pelvic floor physical therapy, and for some patients, the addition of local or orally administered muscle relaxants, or centrally acting pain medications (e.g. Gabapentin, Cymbalta) can be effective.  - has first PFPT appt on 12/2      RTC after MRI    Ivy Sanchez MD  Division of Minimally Invasive Gynecologic Surgery  [facility]

## 2024-11-24 PROCEDURE — RXMED WILLOW AMBULATORY MEDICATION CHARGE

## 2024-11-25 ENCOUNTER — OFFICE VISIT (OUTPATIENT)
Dept: OBSTETRICS AND GYNECOLOGY | Facility: CLINIC | Age: 24
End: 2024-11-25
Payer: COMMERCIAL

## 2024-11-25 ENCOUNTER — PHARMACY VISIT (OUTPATIENT)
Dept: PHARMACY | Facility: CLINIC | Age: 24
End: 2024-11-25
Payer: MEDICAID

## 2024-11-25 VITALS
HEART RATE: 100 BPM | BODY MASS INDEX: 28.57 KG/M2 | SYSTOLIC BLOOD PRESSURE: 119 MMHG | WEIGHT: 156.2 LBS | DIASTOLIC BLOOD PRESSURE: 79 MMHG

## 2024-11-25 DIAGNOSIS — D21.9 FIBROIDS: Primary | ICD-10-CM

## 2024-11-25 DIAGNOSIS — M62.89 PELVIC FLOOR DYSFUNCTION: ICD-10-CM

## 2024-11-25 DIAGNOSIS — N80.03 ADENOMYOSIS: ICD-10-CM

## 2024-11-25 DIAGNOSIS — D50.0 IRON DEFICIENCY ANEMIA DUE TO CHRONIC BLOOD LOSS: ICD-10-CM

## 2024-11-25 DIAGNOSIS — N94.6 DYSMENORRHEA: ICD-10-CM

## 2024-11-25 PROCEDURE — 99214 OFFICE O/P EST MOD 30 MIN: CPT | Performed by: STUDENT IN AN ORGANIZED HEALTH CARE EDUCATION/TRAINING PROGRAM

## 2024-11-25 PROCEDURE — RXMED WILLOW AMBULATORY MEDICATION CHARGE

## 2024-11-25 PROCEDURE — 1036F TOBACCO NON-USER: CPT | Performed by: STUDENT IN AN ORGANIZED HEALTH CARE EDUCATION/TRAINING PROGRAM

## 2024-11-25 RX ORDER — FERROUS SULFATE 325(65) MG
325 TABLET, DELAYED RELEASE (ENTERIC COATED) ORAL 2 TIMES DAILY
Qty: 60 TABLET | Refills: 11 | Status: SHIPPED | OUTPATIENT
Start: 2024-11-25 | End: 2025-11-25

## 2024-11-25 RX ORDER — DOCUSATE SODIUM 100 MG/1
100 CAPSULE, LIQUID FILLED ORAL 2 TIMES DAILY PRN
Qty: 30 CAPSULE | Refills: 5 | Status: SHIPPED | OUTPATIENT
Start: 2024-11-25

## 2024-11-25 RX ORDER — KETOROLAC TROMETHAMINE 10 MG/1
10 TABLET, FILM COATED ORAL EVERY 6 HOURS PRN
Qty: 20 TABLET | Refills: 0 | Status: SHIPPED | OUTPATIENT
Start: 2024-11-25 | End: 2024-11-30

## 2024-11-25 ASSESSMENT — ENCOUNTER SYMPTOMS
DIARRHEA: 0
FREQUENCY: 0
EYES NEGATIVE: 0
BELCHING: 0
NEUROLOGICAL NEGATIVE: 0
CARDIOVASCULAR NEGATIVE: 0
PSYCHIATRIC NEGATIVE: 0
ABDOMINAL PAIN: 1
DYSURIA: 0
CONSTIPATION: 0
ARTHRALGIAS: 0
MUSCULOSKELETAL NEGATIVE: 0
VOMITING: 0
NAUSEA: 0
HEMATOLOGIC/LYMPHATIC NEGATIVE: 0
HEMATOCHEZIA: 0
GASTROINTESTINAL NEGATIVE: 0
FEVER: 0
ANOREXIA: 1
FLATUS: 0
CONSTITUTIONAL NEGATIVE: 0
HEADACHES: 0
WEIGHT LOSS: 0
ENDOCRINE NEGATIVE: 0
HEMATURIA: 0
ALLERGIC/IMMUNOLOGIC NEGATIVE: 0
MYALGIAS: 0
RESPIRATORY NEGATIVE: 0

## 2024-11-25 ASSESSMENT — PAIN SCALES - GENERAL: PAINLEVEL_OUTOF10: 4

## 2024-12-02 ENCOUNTER — EVALUATION (OUTPATIENT)
Dept: PHYSICAL THERAPY | Facility: CLINIC | Age: 24
End: 2024-12-02
Payer: COMMERCIAL

## 2024-12-02 DIAGNOSIS — R10.2 PELVIC PAIN IN FEMALE: ICD-10-CM

## 2024-12-02 PROCEDURE — 97535 SELF CARE MNGMENT TRAINING: CPT | Mod: GP | Performed by: PHYSICAL THERAPIST

## 2024-12-02 PROCEDURE — 97110 THERAPEUTIC EXERCISES: CPT | Mod: GP | Performed by: PHYSICAL THERAPIST

## 2024-12-02 PROCEDURE — 97161 PT EVAL LOW COMPLEX 20 MIN: CPT | Mod: GP | Performed by: PHYSICAL THERAPIST

## 2024-12-02 ASSESSMENT — ENCOUNTER SYMPTOMS
LOSS OF SENSATION IN FEET: 0
DEPRESSION: 0
OCCASIONAL FEELINGS OF UNSTEADINESS: 0

## 2024-12-02 NOTE — PROGRESS NOTES
"Physical Therapy    PELVIC FLOOR EVALUATION AND TREATMENT    Name: Clifford Mandujano \"Ariel\"  MRN: [id number]  : 2000  Today's Date: 24     Time Calculation  Start Time: 1110  Stop Time: 1210  Time Calculation (min): 60 min    PT Evaluation Time Entry  PT Evaluation (Low) Time Entry: 25  PT Therapeutic Procedures Time Entry  Therapeutic Exercise Time Entry: 10  Self-Care/Home Mgmt Training: 15       Visit: 1  Insurance: [facility]  Auth: No   30 visits per year     Assessment:   Pt presents to clinic with chief complaint of pelvic pain, pain with IC, and symptoms of urinary urgency and frequency. Pt does have hx of fibroids and is going to schedule surgery for fibroid removal as well as removal of potential endometriosis adhesions/tissue. Pt demonstrates symptoms consistent with pelvic floor tension/over activity and will benefit from skilled therapy to address pelvic floor down training        Plan:   Treatment/Interventions: Cryotherapy, Dry needling, Education/ Instruction, Electrical stimulation, Hot pack, Manual therapy, Neuromuscular re-education, Self care/ home management, Therapeutic activities, Therapeutic exercises  PT Plan: Skilled PT  PT Frequency: 1 time per week  Duration: 6 weeks  Onset Date: 10/11/24  Rehab Potential: Good  Plan of Care Agreement: Patient      Current Problem:  Problem List Items Addressed This Visit             ICD-10-CM    Pelvic pain in female R10.2    Relevant Orders    Follow Up In Physical Therapy         Subjective   General  Reason for Referral: Pelvic pain  Referred By: Dr. Shalonda Sequeira  Preferred Learning Style: verbal, visual, written  Precautions  STEADI Fall Risk Score (The score of 4 or more indicates an increased risk of falling): 0  Precautions Comment: None       Objective   PELVIC HISTORY:    Chief Complaint/Description of Symptoms:   Pt presents to clinic with chief complaint of pelvic pain  Pt reports she was initially sent to PFPT for potential " "vaginismus       HPI:  Pt reports hx of painful periods since she began her cycle  Painful, heavy periods with associated pain symptoms; \"it just hurts all over\"  Pain with periods described as cramping, aching, inability to perform daily activities d/t pain intensity     Pt also endorses pain with intercourse and difficulty with tampon insertion     Pt will be scheduling surgery for fibroid resection and possible resection of endometriosis tissue; she has had no previous hx of endometrial excision surgery     Pt describes symptoms of urinary urgency and frequency as well     Home Environment/Social Factors/Occupation:   No previous hx of surgeries       PELVIC PAIN:     Description: achy, sharp, cramping type pain   Location: lower abdomen     Pain with IC is more sharp/burning type of pain       Since onset, the symptoms are: unchanged   Pain when emptying bladder: no  Pain with wiping or tight clothing: yes; sometimes; if labia is swollen and aggravated she will have pain and discomfort with wiping  Pain with intercourse: Yes; pain throughout; pain afterwards is typically external d/t aggravation of labia/edema per pt   History of back pain: No     EXERCISE:  Do you do Kegels? No   Current exercise regime:     BLADDER:     Excessive Urinary Urgency: Yes   Daytime Voiding Frequency: can hold up to 2 hrs   Nighttime Voiding Frequency: getting up every 1-2 hrs; does have the ability to hold but then has difficulty sleeping d/t sense of urgency    Unintentional urine loss frequency: No   Leakage occurs with: N/A   Leakage amount: N/A  Difficulty initiating flow of urine: Sometimes  Slow/weak urine stream: Sometimes  Difficulty starting urine stream/push to urinate: Rarely   Able to completely empty bladder: does not feel like she is able to fully empty   Tests performed by doctor: transvaginal ultrasound; no internal examination recently  Frequent UTI's: No     BOWEL:     Excessive Bowel Urgency: No   BM Frequency: " Daily  Frequent Diarrhea: No   Frequent constipation/straining/incomplete emptying: some straining with BM   [place] Stool Scale rating: Type 4    Unintentional stool loss: No       POSTURE:   Increased lumbar lordosis in standing       ROM R/L:  Hip flex: 90 deg /100 deg   Hip IR: mild limitation /mod limitation  Hip ER: WNL/WNL    Tension end range ER   Firm end feel IR bilat     No pain with AROM    STRENGTH  R/L     Hip flexion: 5/5   Hip abd: 5/5   Hip IR: 4+/4+   Hip ER: 4+/4+   Hip add: 5/5     Quadriceps: 5/5   Hamstrings: 4+/4+    No pain with resisted testing     OUTCOMES MEASURE:  Female NIH Chronic Prostatitis Symptom index: 33    Education:  PFPT  Anatomy of pelvic floor   Role of PFPT  Assessment    TREATMENT  Therapeutic exercise:  Butterfly stretch  Happy baby stretch  Child's pose  Figure 4 stretch     Self-care:  Education related to endometriosis  Post op considerations  Exercise post op   Education and instruction on how pelvic floor dysfunction can cause specific symptoms      Careplan Goals:  Problem: PT Problem       Goal: Pt will be independent in urgency control techniques for reduced nocturia to 1x/night for improved sleep quality           Goal: Pt will report reduced pelvic pain by 25% for improved ability to perform daily activities           Goal: Pt will increase pelvic floor flexibility demonstrated by ability to utilize tampon without pain           Goal: Pt will be independent in HEP for home maintenance            Layton Davis, PT

## 2024-12-06 ENCOUNTER — OFFICE VISIT (OUTPATIENT)
Dept: OBSTETRICS AND GYNECOLOGY | Facility: CLINIC | Age: 24
End: 2024-12-06
Payer: COMMERCIAL

## 2024-12-06 VITALS
HEART RATE: 107 BPM | WEIGHT: 160.7 LBS | BODY MASS INDEX: 29.39 KG/M2 | SYSTOLIC BLOOD PRESSURE: 125 MMHG | DIASTOLIC BLOOD PRESSURE: 78 MMHG

## 2024-12-06 DIAGNOSIS — N89.8 VAGINAL DISCHARGE: Primary | ICD-10-CM

## 2024-12-06 DIAGNOSIS — N92.1 MENORRHAGIA WITH IRREGULAR CYCLE: ICD-10-CM

## 2024-12-06 DIAGNOSIS — D62 ACUTE ON CHRONIC BLOOD LOSS ANEMIA: ICD-10-CM

## 2024-12-06 DIAGNOSIS — N94.6 DYSMENORRHEA: ICD-10-CM

## 2024-12-06 DIAGNOSIS — D50.0 IRON DEFICIENCY ANEMIA DUE TO CHRONIC BLOOD LOSS: ICD-10-CM

## 2024-12-06 DIAGNOSIS — D21.9 FIBROIDS: ICD-10-CM

## 2024-12-06 PROCEDURE — RXMED WILLOW AMBULATORY MEDICATION CHARGE

## 2024-12-06 PROCEDURE — 1036F TOBACCO NON-USER: CPT | Performed by: OBSTETRICS & GYNECOLOGY

## 2024-12-06 PROCEDURE — 99213 OFFICE O/P EST LOW 20 MIN: CPT | Performed by: OBSTETRICS & GYNECOLOGY

## 2024-12-06 RX ORDER — NORETHINDRONE 5 MG/1
5 TABLET ORAL DAILY
Qty: 90 TABLET | Refills: 3 | Status: SHIPPED | OUTPATIENT
Start: 2024-12-06 | End: 2025-12-06

## 2024-12-06 ASSESSMENT — ENCOUNTER SYMPTOMS
SORE THROAT: 0
ANOREXIA: 0
DIARRHEA: 0
HEADACHES: 0
CHILLS: 0
FLANK PAIN: 0
VOMITING: 0
ABDOMINAL PAIN: 1
CONSTIPATION: 0
HEMATURIA: 0
FREQUENCY: 1
BACK PAIN: 0
NAUSEA: 0
DYSURIA: 0
FEVER: 0

## 2024-12-06 NOTE — PROGRESS NOTES
Clifford AURA Nazia presents today with:   Concern about recurrent yeast infections - order placed for swab to be obtained when having flare of symptoms (not having them currently).  Discussed vulvodynia and the many etiologies.  Could be hormonally related (although, now has started on NE acetate which could potentially help if related to menses).  Could be due to high tone pelvic floor, which she recently started with pelvic floor PT.    AUB, likely iatrogenic - has started on norethindrone acetate for dysmenorrhea, heavy menstrual bleeding which was likely due to fibroid uterus.  Has since seen Dr. Sanchez, and they made the plan for surgical intervention with myomectomy.  Reviewed the spotting to date is likely due to the NE acetate.    Anemia - started on iron supplementation.  Discussed blood work (retics, iron studies, CBC) to confirm improvement with supplementation.          Prep time on date of patient encounter:  2  Time spent directly with patient/family/caregiver:  20  Coordination of care, ordering of labs, prescriptions:  3  Documentation time: 3  Total time on date of patient encounter: 28        -----------------------------------------------------------------------  HPI    Presents today with  Concern about Yeast infections  Has had since high school  Notes that symptoms occur Right before starts period or right after it ends  Before went to emergency room, had one, but went away on its own  Thinks her symptoms have been consistent with a yeast infection - would always get tested and it came back as a yeast infection  Itching, burning, dicharge  Watery, sometimes clumpy  Not necessarily having the symptoms now.              Visit Vitals  /78   Pulse 107   Wt 72.9 kg (160 lb 11.2 oz)   LMP 11/25/2024 (Approximate) Comment: spotting   BMI 29.39 kg/m²   OB Status Having periods   Smoking Status Never   BSA 1.79 m²       Physical Exam  Constitutional:       Appearance: Normal  appearance.   HENT:      Head: Normocephalic and atraumatic.   Pulmonary:      Effort: Pulmonary effort is normal.   Musculoskeletal:      Cervical back: Neck supple.   Neurological:      General: No focal deficit present.      Mental Status: She is alert.   Psychiatric:         Mood and Affect: Mood normal.

## 2024-12-13 ENCOUNTER — PREP FOR PROCEDURE (OUTPATIENT)
Dept: RADIOLOGY | Facility: HOSPITAL | Age: 24
End: 2024-12-13

## 2024-12-13 ENCOUNTER — HOSPITAL ENCOUNTER (OUTPATIENT)
Dept: RADIOLOGY | Facility: HOSPITAL | Age: 24
Discharge: HOME | End: 2024-12-13
Payer: COMMERCIAL

## 2024-12-13 ENCOUNTER — PHARMACY VISIT (OUTPATIENT)
Dept: PHARMACY | Facility: CLINIC | Age: 24
End: 2024-12-13
Payer: MEDICAID

## 2024-12-13 DIAGNOSIS — D21.9 FIBROIDS: ICD-10-CM

## 2024-12-13 PROCEDURE — RXMED WILLOW AMBULATORY MEDICATION CHARGE

## 2024-12-13 PROCEDURE — 2550000001 HC RX 255 CONTRASTS: Mod: SE | Performed by: STUDENT IN AN ORGANIZED HEALTH CARE EDUCATION/TRAINING PROGRAM

## 2024-12-13 PROCEDURE — A9575 INJ GADOTERATE MEGLUMI 0.1ML: HCPCS | Mod: SE | Performed by: STUDENT IN AN ORGANIZED HEALTH CARE EDUCATION/TRAINING PROGRAM

## 2024-12-13 PROCEDURE — 72197 MRI PELVIS W/O & W/DYE: CPT

## 2024-12-13 RX ORDER — GADOTERATE MEGLUMINE 376.9 MG/ML
14 INJECTION INTRAVENOUS
Status: COMPLETED | OUTPATIENT
Start: 2024-12-13 | End: 2024-12-13

## 2024-12-20 ENCOUNTER — APPOINTMENT (OUTPATIENT)
Dept: PRIMARY CARE | Facility: CLINIC | Age: 24
End: 2024-12-20
Payer: MEDICAID

## 2024-12-20 VITALS
DIASTOLIC BLOOD PRESSURE: 74 MMHG | HEIGHT: 62 IN | SYSTOLIC BLOOD PRESSURE: 109 MMHG | WEIGHT: 155 LBS | OXYGEN SATURATION: 98 % | TEMPERATURE: 96.7 F | HEART RATE: 90 BPM | BODY MASS INDEX: 28.52 KG/M2

## 2024-12-20 DIAGNOSIS — M54.6 ACUTE LEFT-SIDED THORACIC BACK PAIN: ICD-10-CM

## 2024-12-20 DIAGNOSIS — Z13.41 ENCOUNTER FOR AUTISM SCREENING: Primary | ICD-10-CM

## 2024-12-20 PROBLEM — L21.9 SEBORRHEIC DERMATITIS, UNSPECIFIED: Status: ACTIVE | Noted: 2021-01-06

## 2024-12-20 PROBLEM — J38.3 VOCAL CORD DYSFUNCTION: Status: ACTIVE | Noted: 2024-12-20

## 2024-12-20 PROBLEM — L42 PITYRIASIS ROSEA: Status: ACTIVE | Noted: 2021-01-06

## 2024-12-20 PROBLEM — R94.2 ABNORMAL PFTS: Status: ACTIVE | Noted: 2024-12-20

## 2024-12-20 PROBLEM — N93.9 VAGINAL BLEEDING: Status: ACTIVE | Noted: 2024-12-20

## 2024-12-20 PROBLEM — R29.3 POOR POSTURE: Status: ACTIVE | Noted: 2024-12-20

## 2024-12-20 PROBLEM — Z86.69 HISTORY OF MIGRAINE: Status: ACTIVE | Noted: 2024-12-20

## 2024-12-20 PROBLEM — H52.03 HYPERMETROPIA OF BOTH EYES: Status: ACTIVE | Noted: 2024-12-20

## 2024-12-20 PROBLEM — J30.9 ALLERGIC RHINITIS: Status: ACTIVE | Noted: 2024-12-20

## 2024-12-20 PROBLEM — M54.50 LUMBAR PAIN: Status: ACTIVE | Noted: 2024-12-20

## 2024-12-20 PROBLEM — B37.31 CANDIDIASIS OF VAGINA: Status: ACTIVE | Noted: 2024-12-20

## 2024-12-20 PROBLEM — B36.0 PITYRIASIS VERSICOLOR: Status: ACTIVE | Noted: 2021-01-06

## 2024-12-20 PROBLEM — M62.81 TRUNCAL MUSCLE WEAKNESS: Status: ACTIVE | Noted: 2024-12-20

## 2024-12-20 PROBLEM — R51.9 HEADACHE: Status: ACTIVE | Noted: 2024-12-20

## 2024-12-20 PROBLEM — Z59.41 FOOD INSECURITY: Status: ACTIVE | Noted: 2024-12-20

## 2024-12-20 PROBLEM — R21 RASH AND OTHER NONSPECIFIC SKIN ERUPTION: Status: ACTIVE | Noted: 2021-01-06

## 2024-12-20 PROBLEM — J45.20 INTERMITTENT ASTHMA WITHOUT COMPLICATION (HHS-HCC): Status: ACTIVE | Noted: 2024-12-20

## 2024-12-20 PROBLEM — E55.9 VITAMIN D DEFICIENCY: Status: ACTIVE | Noted: 2024-12-20

## 2024-12-20 ASSESSMENT — PAIN SCALES - GENERAL: PAINLEVEL_OUTOF10: 0-NO PAIN

## 2024-12-20 NOTE — PROGRESS NOTES
"Subjective   Patient ID: Clifford Mandujano \"Ariel\" is a 24 y.o. female who presents for Follow-up.      #Autism concern  -Patient here for to discuss concern for autism  -Patient states her) today she is specifically eating patterns, does not \" on things\", and is often too literal.  Friend states that she is \"off\"  -Brother agrees that she may be on the autism spectrum  -Patient cites taking longer to perform activities of daily living such as getting dressed, brushing teeth as she is easily distracted  -Reports leg shaking, lip/inside of cheek biting,  :: Patient describes differences in social communication and interaction as well as repetitive behaviors that may be aligned with autism spectrum disorder.  However, patient taking longer to complete tasks may be related to attention deficit disorder  -Refer to adult neuropsychology for further evaluation of autism/ADHD    Review of Systems  As per HPI  Previous history  Past Medical History:   Diagnosis Date    Chlamydial infection, unspecified 08/03/2020    Chlamydia    Dysmenorrhea, unspecified 10/11/2018    Adolescent dysmenorrhea    Encounter for immunization     Immunization due    Encounter for other specified special examinations     Diagnostic skin test    Encounter for routine child health examination with abnormal findings 08/08/2018    Encounter for routine child health examination with abnormal findings    Encounter for surveillance of contraceptive pills 01/31/2021    Encounter for surveillance of contraceptive pills    Hypermetropia, right eye 05/29/2019    Hypermetropia of right eye    Other conditions influencing health status     Full-term infant    Other diseases of vocal cords     Vocal cord dysfunction    Personal history of diseases of the skin and subcutaneous tissue 06/28/2018    History of acne    Personal history of other diseases of the nervous system and sense organs     History of migraine    Personal history of other endocrine, " nutritional and metabolic disease 05/06/2015    History of vitamin D deficiency    Personal history of other infectious and parasitic diseases 08/07/2018    History of candidiasis of vagina    Personal history of other specified conditions 04/29/2020    History of epistaxis    Personal history of other specified conditions 03/07/2016    History of headache    Unspecified asthma with (acute) exacerbation ([facility] 11/03/2014    Asthma exacerbation     History reviewed. No pertinent surgical history.  Social History     Tobacco Use    Smoking status: Never    Smokeless tobacco: Never   Vaping Use    Vaping status: Never Used   Substance Use Topics    Alcohol use: Yes     Comment: social    Drug use: Never     Family History   Problem Relation Name Age of Onset    Fibroids Mother          had genetic testing for Cordero syndrome which was negative    Endometrial cancer Maternal Grandmother      Stroke Maternal Grandmother      Ovarian cancer Neg Hx      Colon cancer Neg Hx       Allergies   Allergen Reactions    Cat Dander Unknown    Dog Dander Unknown    Grass Pollen Unknown    Histamine Unknown    Histamine Phosphate Unknown    Ragweed Unknown    Weed Pollen-Short Ragweed Unknown     Current Outpatient Medications   Medication Instructions    albuterol (Ventolin HFA) 90 mcg/actuation inhaler 2 puffs, inhalation, Every 4 hours PRN    cetirizine (ZYRTEC) 10 mg, oral, Daily    docusate sodium (COLACE) 100 mg, oral, 2 times daily PRN    ferrous sulfate 325 (65 Fe) MG EC tablet 1 tablet, oral, 2 times daily, Do not crush, chew, or split.    fluticasone (Flonase) 50 mcg/actuation nasal spray 1 spray, Each Nostril, Daily, Shake gently. Before first use, prime pump. After use, clean tip and replace cap.    naproxen (NAPROSYN) 500 mg, oral, 2 times daily    norethindrone (AYGESTIN) 5 mg, oral, Daily       Objective       Physical Exam  Constitutional:       Comments: Maintains eye contact   Eyes:      Extraocular Movements:  "Extraocular movements intact.   Cardiovascular:      Rate and Rhythm: Normal rate.   Pulmonary:      Effort: Pulmonary effort is normal.   Neurological:      Mental Status: She is alert.   Psychiatric:         Mood and Affect: Mood normal.       Assessment/Plan   Clifford Mandujano \"Ariel\" is a 24 y.o. female with history of asthma who presents for the concerns below:    #Autism concern  :: Patient describes differences in social communication and interaction as well as repetitive behaviors that may be aligned with autism spectrum disorder.  However, patient taking longer to complete tasks may be related to attention deficit disorder  -Refer to adult neuropsychology for further evaluation of autism/ADHD    Problem List Items Addressed This Visit    None  Visit Diagnoses       Encounter for autism screening    -  Primary    Relevant Orders    Referral to Adult Neuropsychology    Acute left-sided thoracic back pain              Return in : 3 months    Discussed with co-signer of note Dr. Alexander      Portions of this note were generated using digital voice recognition software, and may contain grammatical errors       Iftikhar Zamora, DO  Family Medicine PGY-3  "

## 2025-01-02 NOTE — PROGRESS NOTES
Division of Minimally Invasive Gynecologic Surgery  [facility]    01/02/25 Gynecology Visit    CC:   No chief complaint on file.      Clifford Mandujano is a 24 y.o. referred to our practice by Dr. Sequeira    Pt reports irregular menses since menarche. When she did have a period menses would last up to a week but typically lasting 3-5 days.  Reports painful periods since menarche with severe cramping and nausea. Uses heating pads. Midol, tylenol withotu relief. Has tried OCPs and patch previously without improvement.  Since the past year she is in near daily discomfort and periods are now longer lasting 7 days. Has to wear diapers on heavy days because she would soak through a tampon/pad.   She was started on norethindrone at her visit with Dr. Sequeira on 10/11/24. Pain has been better on the norethindrone until Saturday when she went to the ED for abdominal pain.   PFPT- scheduled to start 12/2    GI symptoms: none  Urinary symptoms: none  Sexual activity: not currently. Has pain with all aspects of sex. States she thinks she has vaginismus and has spasms with initial penetration    Prior Therapies: OCPs, patch    Imaging:   --TVUS:  uterus measures  8.8 cm x 9.6 cm x 11.3 cm. Multiple  heterogeneous, hypoechoic uterine lesions are identified with  posterior shadowing most consistent with leiomyomas. Normal adnexa  --MRI: The uterus is anteverted and is markedly enlarged in size with  multilobulated appearance due to multiple enlarged well-circumscribed  intramural fibroids. The largest fibroid measures 5.3 x 5.8 cm. Enlarged multilobulated leiomyomatous uterus with some  demonstrating hemorrhagic degeneration. No evidence of endometriosis.  Labs: H/H 9.8/30.1     GYN Hx  Gynecologic history:  Contraception/menstrual regulation: norethindrone  Desires Childbearing: yes  Last pap: normal last year  Maternal grandmother with uterine cancer. No family history of breast, ovarian, colon  or endometrial cancer.       OBHx: G0  Pertinent PMH: asthma, fibroids  Pertient PSH: none    PMHx, PSHx, SHx, Allergies, and Medications updated in Epic.  Past Medical History:   Diagnosis Date    Chlamydial infection, unspecified 08/03/2020    Chlamydia    Dysmenorrhea, unspecified 10/11/2018    Adolescent dysmenorrhea    Encounter for immunization     Immunization due    Encounter for other specified special examinations     Diagnostic skin test    Encounter for routine child health examination with abnormal findings 08/08/2018    Encounter for routine child health examination with abnormal findings    Encounter for surveillance of contraceptive pills 01/31/2021    Encounter for surveillance of contraceptive pills    Hypermetropia, right eye 05/29/2019    Hypermetropia of right eye    Other conditions influencing health status     Full-term infant    Other diseases of vocal cords     Vocal cord dysfunction    Personal history of diseases of the skin and subcutaneous tissue 06/28/2018    History of acne    Personal history of other diseases of the nervous system and sense organs     History of migraine    Personal history of other endocrine, nutritional and metabolic disease 05/06/2015    History of vitamin D deficiency    Personal history of other infectious and parasitic diseases 08/07/2018    History of candidiasis of vagina    Personal history of other specified conditions 04/29/2020    History of epistaxis    Personal history of other specified conditions 03/07/2016    History of headache    Unspecified asthma with (acute) exacerbation ([facility]) 11/03/2014    Asthma exacerbation     No past surgical history on file.  Allergies   Allergen Reactions    Cat Dander Unknown    Dog Dander Unknown    Grass Pollen Unknown    Histamine Unknown    Histamine Phosphate Unknown    Ragweed Unknown    Weed Pollen-Short Ragweed Unknown       Current Outpatient Medications:     albuterol (Ventolin HFA) 90 mcg/actuation  inhaler, Inhale 2 puffs every 4 hours if needed for wheezing or shortness of breath., Disp: 8.5 g, Rfl: 5    cetirizine (ZyrTEC) 10 mg tablet, Take 1 tablet (10 mg) by mouth once daily., Disp: 30 tablet, Rfl: 2    docusate sodium (Colace) 100 mg capsule, Take 1 capsule (100 mg) by mouth 2 times a day as needed for constipation., Disp: 30 capsule, Rfl: 5    ferrous sulfate 325 (65 Fe) MG EC tablet, Take 1 tablet by mouth 2 times a day. Do not crush, chew, or split., Disp: 60 tablet, Rfl: 11    fluticasone (Flonase) 50 mcg/actuation nasal spray, Administer 1 spray into each nostril once daily. Shake gently. Before first use, prime pump. After use, clean tip and replace cap., Disp: 16 g, Rfl: 5    naproxen (Naprosyn) 500 mg tablet, Take 1 tablet (500 mg) by mouth 2 times a day., Disp: 20 tablet, Rfl: 0    norethindrone (Aygestin) 5 mg tablet, Take 1 tablet (5 mg) by mouth once daily., Disp: 90 tablet, Rfl: 3  Social History     Socioeconomic History    Marital status: Single     Spouse name: Not on file    Number of children: Not on file    Years of education: Not on file    Highest education level: Not on file   Occupational History    Not on file   Tobacco Use    Smoking status: Never    Smokeless tobacco: Never   Vaping Use    Vaping status: Never Used   Substance and Sexual Activity    Alcohol use: Yes     Comment: social    Drug use: Never    Sexual activity: Not Currently   Other Topics Concern    Not on file   Social History Narrative    Not on file     Social Drivers of Health     Financial Resource Strain: Not on file   Food Insecurity: Not on file   Transportation Needs: Not on file   Physical Activity: Not on file   Stress: Not on file   Social Connections: Not on file   Intimate Partner Violence: Not on file   Housing Stability: Not on file     Family History   Problem Relation Name Age of Onset    Fibroids Mother          had genetic testing for Cordero syndrome which was negative    Endometrial cancer  Maternal Grandmother      Stroke Maternal Grandmother      Ovarian cancer Neg Hx      Colon cancer Neg Hx       OB History          0    Para   0    Term   0       0    AB   0    Living   0         SAB   0    IAB   0    Ectopic   0    Multiple   0    Live Births   0                    ROS: reviewed and negative    PE:There were no vitals taken for this visit.  Gen: NAD  Psych: AAOx3  Skin: no lesions  Pulm: nonlabored breathing, normal respiratory effort  Cards: normal peripheral perfusion  Lymph: no LAD in axilla or groin  GI: soft, non-tender, non-distended, no rebound/guarding, no masses  :deferred      A/P: Clifford Mandujano is a 24 y.o. with pelvic pain, fibroids and possible endometriosis vs adenomyosis    Fibroids  - Reviewed the diagnosis and pathophysiology of fibroids with the patient. Reviewed that fibroids are generally benign growths of the uterine muscle that can either be asymptomatic or symptomatic in the form of bleeding or bulk symptoms.  - Expectant management-The patient's fibroids were discussed and expectant management was offered with strict precautions.  - Hormonal management -- We reviewed hormonal methods to try and minimize bleeding. We discussed that the bulk symptoms are generally not improved with hormonal therapy and sometimes do not improve with menopause if very large, albeit the fact that fibroids generally shrink with menopause. Discussed various forms of hormonal management including OCPs, patch, ring, deop-provera, Nexplanon, and IUD. Contraindications, proper use, side effects reviewed.  - Lupron/Elagolix - Discussed the use of lupron to help with preoperative shrinkage of fibroids to maximize the change of a laparoscopic approach or minimize the need for a vertical midline incision and increase preoperative blood counts. Discussed that Lupron is not a definitive management and reviewed the side effects  - UAE-The patient was offered UAE/UFE and the procedure was  discussed in detail including risks/benefits. Reviewed the UAE should not be considered if patient want to preserve fertility  - Myomectomy - Reviewed that while a laparoscopic myomectomy is preferred, size, location and number of fibroids may impact the best surgical approach. An open approach may be preferable to maximize fertility and symptom management. Reviewed risk of malignant dissemination during myomectomy. Reviewed the need for C/S for delivery for further pregnancies. Also, though not in all cases, myomectomy is generally reserved for those wishing to pursue pregnancy in the future. Regarding risks of surgery for hysterectomy vs. myomectomy, potential risks with myomectomy include longer surgery, greater blood loss, risk of fibroid recurrence, potential need for future surgery related to fibroid symptoms, and inability to guarantee future fertility compared to hysterectomy.  - Hysterectomy - If no longer desiring fertility, definitive surgical management  was discussed with the patient. Recommended this over myomectomy in cases of no further interest in fertility for complete symptom management.   - MRI reviewed: innerumable fiborids. Given that number of fibroids on MRI, do not recommend minimally invasive approach. ***  - Rx toradol for degenerating fibroid  - pt desires myomectomy  -will obtain MRI for further characterization    Pelvic pain  - We  discussed the multiple etiologies of chronic pelvic pain, including endometriosis, adenomyosis, GI etiologies, MSK etiologies, and centralization of pain. We discussed the role of surgical excision in the management of endometriosis and the estimated 15-20% recurrence rate in the future. We also reviewed the association between endometriosis and adenomyosis, as well as the fact that definitive diagnosis of adenomyosis cannot be made without hysterectomy.   -MRI does not show endometriosis, however this cannot be definitive evaluation for endometriosis.   -  continue norethindrone    Anemia  - Hgb 9.8  - Rx iron, colace    Pelvic floor dysfunction  - The patient was counseled that pelvic pain may have many sources, and at times, pelvic floor muscle spasm can be a major contributor. The origin of pelvic floor muscle spasm can be multifactorial, including primary, reactive to a different pain source, trauma, or even part of a centralized pain syndrome. With regard to pelvic floor muscle spasm, her clinical history and exam findings support this diagnosis. Our primary intervention is typically pelvic floor physical therapy, and for some patients, the addition of local or orally administered muscle relaxants, or centrally acting pain medications (e.g. Gabapentin, Cymbalta) can be effective.  - has first PFPT appt on 12/2      RTC after MRI    Ivy Sanchez MD  Division of Minimally Invasive Gynecologic Surgery  [facility]

## 2025-01-03 ENCOUNTER — APPOINTMENT (OUTPATIENT)
Dept: PHYSICAL THERAPY | Facility: CLINIC | Age: 25
End: 2025-01-03
Payer: COMMERCIAL

## 2025-01-06 ENCOUNTER — PREP FOR PROCEDURE (OUTPATIENT)
Dept: OBSTETRICS AND GYNECOLOGY | Facility: CLINIC | Age: 25
End: 2025-01-06

## 2025-01-06 ENCOUNTER — APPOINTMENT (OUTPATIENT)
Dept: OBSTETRICS AND GYNECOLOGY | Facility: CLINIC | Age: 25
End: 2025-01-06
Payer: COMMERCIAL

## 2025-01-06 ENCOUNTER — LAB (OUTPATIENT)
Dept: LAB | Facility: LAB | Age: 25
End: 2025-01-06
Payer: COMMERCIAL

## 2025-01-06 ENCOUNTER — OFFICE VISIT (OUTPATIENT)
Dept: OBSTETRICS AND GYNECOLOGY | Facility: CLINIC | Age: 25
End: 2025-01-06
Payer: COMMERCIAL

## 2025-01-06 VITALS
BODY MASS INDEX: 29.16 KG/M2 | SYSTOLIC BLOOD PRESSURE: 117 MMHG | DIASTOLIC BLOOD PRESSURE: 78 MMHG | HEART RATE: 111 BPM | WEIGHT: 159.44 LBS

## 2025-01-06 DIAGNOSIS — D21.9 FIBROIDS: ICD-10-CM

## 2025-01-06 DIAGNOSIS — D50.0 IRON DEFICIENCY ANEMIA DUE TO CHRONIC BLOOD LOSS: ICD-10-CM

## 2025-01-06 DIAGNOSIS — D21.9 FIBROIDS: Primary | ICD-10-CM

## 2025-01-06 DIAGNOSIS — N94.6 DYSMENORRHEA: ICD-10-CM

## 2025-01-06 DIAGNOSIS — N80.03 ADENOMYOSIS: ICD-10-CM

## 2025-01-06 LAB
ERYTHROCYTE [DISTWIDTH] IN BLOOD BY AUTOMATED COUNT: 18.2 % (ref 11.5–14.5)
FERRITIN SERPL-MCNC: 20 NG/ML (ref 8–150)
HCT VFR BLD AUTO: 31.6 % (ref 36–46)
HGB BLD-MCNC: 9.9 G/DL (ref 12–16)
HGB RETIC QN: 26 PG (ref 28–38)
IMMATURE RETIC FRACTION: 14.7 %
MCH RBC QN AUTO: 24.6 PG (ref 26–34)
MCHC RBC AUTO-ENTMCNC: 31.3 G/DL (ref 32–36)
MCV RBC AUTO: 79 FL (ref 80–100)
NRBC BLD-RTO: 0 /100 WBCS (ref 0–0)
PLATELET # BLD AUTO: 371 X10*3/UL (ref 150–450)
RBC # BLD AUTO: 4.02 X10*6/UL (ref 4–5.2)
RETICS #: 0.07 X10*6/UL (ref 0.02–0.08)
RETICS/RBC NFR AUTO: 1.6 % (ref 0.5–2)
WBC # BLD AUTO: 5.6 X10*3/UL (ref 4.4–11.3)

## 2025-01-06 PROCEDURE — 82728 ASSAY OF FERRITIN: CPT

## 2025-01-06 PROCEDURE — 99215 OFFICE O/P EST HI 40 MIN: CPT | Performed by: STUDENT IN AN ORGANIZED HEALTH CARE EDUCATION/TRAINING PROGRAM

## 2025-01-06 PROCEDURE — 85045 AUTOMATED RETICULOCYTE COUNT: CPT

## 2025-01-06 PROCEDURE — 85027 COMPLETE CBC AUTOMATED: CPT

## 2025-01-06 PROCEDURE — 1036F TOBACCO NON-USER: CPT | Performed by: STUDENT IN AN ORGANIZED HEALTH CARE EDUCATION/TRAINING PROGRAM

## 2025-01-06 RX ORDER — CELECOXIB 400 MG/1
400 CAPSULE ORAL ONCE
OUTPATIENT
Start: 2025-01-06 | End: 2025-01-06

## 2025-01-06 RX ORDER — GABAPENTIN 600 MG/1
600 TABLET ORAL ONCE
OUTPATIENT
Start: 2025-01-06 | End: 2025-01-06

## 2025-01-06 RX ORDER — ACETAMINOPHEN 325 MG/1
975 TABLET ORAL ONCE
OUTPATIENT
Start: 2025-01-06 | End: 2025-01-06

## 2025-01-06 ASSESSMENT — PAIN SCALES - GENERAL: PAINLEVEL_OUTOF10: 2

## 2025-01-06 NOTE — PROGRESS NOTES
Division of Minimally Invasive Gynecologic Surgery  [facility]    01/06/25 Gynecology Visit    CC:   Chief Complaint   Patient presents with    Follow-up     Pt in office for MRI FUV  Chaperone declined    Abdominal Pain    chest pain    Nausea/Vomiting In Pregnancy       Clifford Mandujano is a 24 y.o. referred to our practice by Dr. Sequeira    Pt reports irregular menses since menarche. When she did have a period menses would last up to a week but typically lasting 3-5 days.  Reports painful periods since menarche with severe cramping and nausea. Uses heating pads. Midol, tylenol withotu relief. Has tried OCPs and patch previously without improvement.  Since the past year she is in near daily discomfort and periods are now longer lasting 7 days. Has to wear diapers on heavy days because she would soak through a tampon/pad.   She was started on norethindrone at her visit with Dr. Sequeira on 10/11/24. Pain and bleeding have improved but not completely resolved.  PFPT- scheduled to start 12/2    GI symptoms: none  Urinary symptoms: none  Sexual activity: not currently. Has pain with all aspects of sex. States she thinks she has vaginismus and has spasms with initial penetration    Prior Therapies: OCPs, patch    Imaging:   --TVUS:  uterus measures  8.8 cm x 9.6 cm x 11.3 cm. Multiple  heterogeneous, hypoechoic uterine lesions are identified with  posterior shadowing most consistent with leiomyomas. Normal adnexa  --MRI: The uterus is anteverted and is markedly enlarged in size with  multilobulated appearance due to multiple enlarged well-circumscribed  intramural fibroids. The largest fibroid measures 5.3 x 5.8 cm. Enlarged multilobulated leiomyomatous uterus with some  demonstrating hemorrhagic degeneration. No evidence of endometriosis.  Labs: H/H 9.8/30.1     GYN Hx  Gynecologic history:  Contraception/menstrual regulation: norethindrone  Desires Childbearing: yes  Last pap: normal  last year  Maternal grandmother with uterine cancer. No family history of breast, ovarian, colon or endometrial cancer.       OBHx: G0  Pertinent PMH: asthma, fibroids  Pertient PSH: none    PMHx, PSHx, SHx, Allergies, and Medications updated in Epic.  Past Medical History:   Diagnosis Date    Chlamydial infection, unspecified 08/03/2020    Chlamydia    Dysmenorrhea, unspecified 10/11/2018    Adolescent dysmenorrhea    Encounter for immunization     Immunization due    Encounter for other specified special examinations     Diagnostic skin test    Encounter for routine child health examination with abnormal findings 08/08/2018    Encounter for routine child health examination with abnormal findings    Encounter for surveillance of contraceptive pills 01/31/2021    Encounter for surveillance of contraceptive pills    Hypermetropia, right eye 05/29/2019    Hypermetropia of right eye    Other conditions influencing health status     Full-term infant    Other diseases of vocal cords     Vocal cord dysfunction    Personal history of diseases of the skin and subcutaneous tissue 06/28/2018    History of acne    Personal history of other diseases of the nervous system and sense organs     History of migraine    Personal history of other endocrine, nutritional and metabolic disease 05/06/2015    History of vitamin D deficiency    Personal history of other infectious and parasitic diseases 08/07/2018    History of candidiasis of vagina    Personal history of other specified conditions 04/29/2020    History of epistaxis    Personal history of other specified conditions 03/07/2016    History of headache    Unspecified asthma with (acute) exacerbation ([facility] 11/03/2014    Asthma exacerbation     History reviewed. No pertinent surgical history.  Allergies   Allergen Reactions    Cat Dander Unknown    Dog Dander Unknown    Grass Pollen Unknown    Histamine Unknown    Histamine Phosphate Unknown    Ragweed Unknown    Weed  Pollen-Short Ragweed Unknown       Current Outpatient Medications:     albuterol (Ventolin HFA) 90 mcg/actuation inhaler, Inhale 2 puffs every 4 hours if needed for wheezing or shortness of breath., Disp: 8.5 g, Rfl: 5    cetirizine (ZyrTEC) 10 mg tablet, Take 1 tablet (10 mg) by mouth once daily., Disp: 30 tablet, Rfl: 2    docusate sodium (Colace) 100 mg capsule, Take 1 capsule (100 mg) by mouth 2 times a day as needed for constipation., Disp: 30 capsule, Rfl: 5    ferrous sulfate 325 (65 Fe) MG EC tablet, Take 1 tablet by mouth 2 times a day. Do not crush, chew, or split., Disp: 60 tablet, Rfl: 11    fluticasone (Flonase) 50 mcg/actuation nasal spray, Administer 1 spray into each nostril once daily. Shake gently. Before first use, prime pump. After use, clean tip and replace cap., Disp: 16 g, Rfl: 5    norethindrone (Aygestin) 5 mg tablet, Take 1 tablet (5 mg) by mouth once daily., Disp: 90 tablet, Rfl: 3    naproxen (Naprosyn) 500 mg tablet, Take 1 tablet (500 mg) by mouth 2 times a day. (Patient not taking: Reported on 1/6/2025), Disp: 20 tablet, Rfl: 0  Social History     Socioeconomic History    Marital status: Single     Spouse name: Not on file    Number of children: Not on file    Years of education: Not on file    Highest education level: Not on file   Occupational History    Not on file   Tobacco Use    Smoking status: Never    Smokeless tobacco: Never   Vaping Use    Vaping status: Never Used   Substance and Sexual Activity    Alcohol use: Not Currently     Alcohol/week: 0.0 - 1.0 standard drinks of alcohol     Comment: social    Drug use: Never    Sexual activity: Not Currently   Other Topics Concern    Not on file   Social History Narrative    Not on file     Social Drivers of Health     Financial Resource Strain: Not on file   Food Insecurity: Not on file   Transportation Needs: Not on file   Physical Activity: Not on file   Stress: Not on file   Social Connections: Not on file   Intimate Partner  Violence: Not on file   Housing Stability: Not on file     Family History   Problem Relation Name Age of Onset    Fibroids Mother          had genetic testing for Cordero syndrome which was negative    Endometrial cancer Maternal Grandmother      Stroke Maternal Grandmother      Ovarian cancer Neg Hx      Colon cancer Neg Hx       OB History          0    Para   0    Term   0       0    AB   0    Living   0         SAB   0    IAB   0    Ectopic   0    Multiple   0    Live Births   0                    ROS: reviewed and negative    PE:/78   Pulse (!) 111   Wt 72.3 kg (159 lb 7 oz)   LMP 2024 (Exact Date)   BMI 29.16 kg/m²   Gen: NAD  Psych: AAOx3  Skin: no lesions  Pulm: nonlabored breathing, normal respiratory effort  Cards: normal peripheral perfusion  Lymph: no LAD in axilla or groin  GI: soft, non-tender, non-distended, no rebound/guarding, no masses  :deferred      A/P: Clifford AURA Mandujano is a 24 y.o. with pelvic pain and multifibroid uterus    Fibroids  - Reviewed the diagnosis and pathophysiology of fibroids with the patient. Reviewed that fibroids are generally benign growths of the uterine muscle that can either be asymptomatic or symptomatic in the form of bleeding or bulk symptoms.  - Expectant management-The patient's fibroids were discussed and expectant management was offered with strict precautions.  - Hormonal management -- We reviewed hormonal methods to try and minimize bleeding. We discussed that the bulk symptoms are generally not improved with hormonal therapy and sometimes do not improve with menopause if very large, albeit the fact that fibroids generally shrink with menopause. Discussed various forms of hormonal management including OCPs, patch, ring, deop-provera, Nexplanon, and IUD. Contraindications, proper use, side effects reviewed.  - Lupron/Elagolix - Discussed the use of lupron to help with preoperative shrinkage of fibroids to maximize the change of a  laparoscopic approach or minimize the need for a vertical midline incision and increase preoperative blood counts. Discussed that Lupron is not a definitive management and reviewed the side effects  - UAE-The patient was offered UAE/UFE and the procedure was discussed in detail including risks/benefits. Reviewed the UAE should not be considered if patient want to preserve fertility  - Myomectomy - Reviewed that while a laparoscopic myomectomy is preferred, size, location and number of fibroids may impact the best surgical approach. An open approach may be preferable to maximize fertility and symptom management. Reviewed risk of malignant dissemination during myomectomy. Reviewed the need for C/S for delivery for further pregnancies. Also, though not in all cases, myomectomy is generally reserved for those wishing to pursue pregnancy in the future. Regarding risks of surgery for hysterectomy vs. myomectomy, potential risks with myomectomy include longer surgery, greater blood loss, risk of fibroid recurrence, potential need for future surgery related to fibroid symptoms, and inability to guarantee future fertility compared to hysterectomy.  - MRI reviewed: innerumable fiborids. Given that number of fibroids on MRI, do not recommend minimally invasive approach.   - pt desires myomectomy    Given her age and desire for future fertility, myomectomy is the most reasonable option. Given the size, number and location of fibroids, I am recommending an abdominal approach. Specific to myomectomy, we discussed the risks including: longer surgery, greater blood loss, risk of fibroid recurrence, potential need for future surgery related to fibroid symptoms, and inability to guarantee future fertility compared to hysterectomy. We discussed that she would need to be delivered by c/s for any future pregnancies and that we recommend waiting 3-6 months before attempting conception. We discussed that even with abdominal myomectomy,  the abdomen would potentially be exposed to myoma cells. We discussed that the risk of encountering occult malignancy, including leiomyosarcoma, is unknown, but current understanding estimates the risk at 2:1000. She accepts the inherent risk with myomectomy given her desire for future fertility and young age.    Anemia  - Hgb 9.8  - continue iron, colace  - repeat CBC      She will be posted for abdominal myomectomy and all indicated procedures    Ivy Sanchez MD  Division of Minimally Invasive Gynecologic Surgery  [facility]

## 2025-01-07 RX ORDER — DIPHENHYDRAMINE HYDROCHLORIDE 50 MG/ML
50 INJECTION INTRAMUSCULAR; INTRAVENOUS AS NEEDED
OUTPATIENT
Start: 2025-01-07

## 2025-01-07 RX ORDER — FAMOTIDINE 10 MG/ML
20 INJECTION INTRAVENOUS ONCE AS NEEDED
OUTPATIENT
Start: 2025-01-07

## 2025-01-07 RX ORDER — EPINEPHRINE 0.3 MG/.3ML
0.3 INJECTION SUBCUTANEOUS EVERY 5 MIN PRN
OUTPATIENT
Start: 2025-01-07

## 2025-01-07 RX ORDER — ALBUTEROL SULFATE 0.83 MG/ML
3 SOLUTION RESPIRATORY (INHALATION) AS NEEDED
OUTPATIENT
Start: 2025-01-07

## 2025-01-09 PROCEDURE — RXMED WILLOW AMBULATORY MEDICATION CHARGE

## 2025-01-14 ENCOUNTER — PHARMACY VISIT (OUTPATIENT)
Dept: PHARMACY | Facility: CLINIC | Age: 25
End: 2025-01-14
Payer: MEDICAID

## 2025-01-15 DIAGNOSIS — D21.9 FIBROIDS: Primary | ICD-10-CM

## 2025-01-16 ENCOUNTER — APPOINTMENT (OUTPATIENT)
Dept: RADIOLOGY | Facility: HOSPITAL | Age: 25
End: 2025-01-16
Payer: COMMERCIAL

## 2025-01-16 ENCOUNTER — HOSPITAL ENCOUNTER (EMERGENCY)
Facility: HOSPITAL | Age: 25
Discharge: HOME | End: 2025-01-16
Attending: EMERGENCY MEDICINE
Payer: COMMERCIAL

## 2025-01-16 VITALS
HEART RATE: 80 BPM | DIASTOLIC BLOOD PRESSURE: 79 MMHG | RESPIRATION RATE: 16 BRPM | SYSTOLIC BLOOD PRESSURE: 123 MMHG | OXYGEN SATURATION: 98 % | TEMPERATURE: 98.5 F

## 2025-01-16 DIAGNOSIS — S09.90XA INJURY OF HEAD, INITIAL ENCOUNTER: Primary | ICD-10-CM

## 2025-01-16 LAB — PREGNANCY TEST URINE, POC: NEGATIVE

## 2025-01-16 PROCEDURE — 99284 EMERGENCY DEPT VISIT MOD MDM: CPT

## 2025-01-16 PROCEDURE — 2500000004 HC RX 250 GENERAL PHARMACY W/ HCPCS (ALT 636 FOR OP/ED): Mod: SE

## 2025-01-16 PROCEDURE — 70450 CT HEAD/BRAIN W/O DYE: CPT

## 2025-01-16 PROCEDURE — 99284 EMERGENCY DEPT VISIT MOD MDM: CPT | Mod: 25 | Performed by: EMERGENCY MEDICINE

## 2025-01-16 PROCEDURE — 81025 URINE PREGNANCY TEST: CPT

## 2025-01-16 PROCEDURE — 70486 CT MAXILLOFACIAL W/O DYE: CPT

## 2025-01-16 PROCEDURE — 76377 3D RENDER W/INTRP POSTPROCES: CPT

## 2025-01-16 PROCEDURE — 2500000001 HC RX 250 WO HCPCS SELF ADMINISTERED DRUGS (ALT 637 FOR MEDICARE OP): Mod: SE

## 2025-01-16 RX ORDER — ONDANSETRON 4 MG/1
4 TABLET, ORALLY DISINTEGRATING ORAL ONCE
Status: COMPLETED | OUTPATIENT
Start: 2025-01-16 | End: 2025-01-16

## 2025-01-16 RX ORDER — ONDANSETRON 4 MG/1
4 TABLET, FILM COATED ORAL EVERY 6 HOURS
Qty: 12 TABLET | Refills: 0 | Status: SHIPPED | OUTPATIENT
Start: 2025-01-16 | End: 2025-01-20

## 2025-01-16 RX ORDER — ACETAMINOPHEN 325 MG/1
650 TABLET ORAL EVERY 6 HOURS PRN
Qty: 30 TABLET | Refills: 0 | Status: SHIPPED | OUTPATIENT
Start: 2025-01-16 | End: 2025-01-21

## 2025-01-16 RX ORDER — ACETAMINOPHEN 325 MG/1
650 TABLET ORAL ONCE
Status: COMPLETED | OUTPATIENT
Start: 2025-01-16 | End: 2025-01-16

## 2025-01-16 RX ADMIN — ACETAMINOPHEN 650 MG: 325 TABLET ORAL at 19:24

## 2025-01-16 RX ADMIN — ONDANSETRON 4 MG: 4 TABLET, ORALLY DISINTEGRATING ORAL at 19:24

## 2025-01-16 NOTE — Clinical Note
Clifford Mandujano was seen and treated in our emergency department on 1/16/2025.  She may return to work on 01/20/2025.  Patient may return to work with limited exertional activity.  If she is experiencing symptoms of headache, nausea, disorientation it is important that she rest and return to activity as tolerated.     If you have any questions or concerns, please don't hesitate to call.      Ena Gonzalez PA-C

## 2025-01-16 NOTE — ED TRIAGE NOTES
Pt states she was hit in her head with a door yesterday now feels like her teeth dont line up correctly, her nose hurts and was bleeding yesterday, along with nausea and headache. Denies LOC, thinners.

## 2025-01-17 ENCOUNTER — TREATMENT (OUTPATIENT)
Dept: PHYSICAL THERAPY | Facility: CLINIC | Age: 25
End: 2025-01-17
Payer: COMMERCIAL

## 2025-01-17 ENCOUNTER — PHARMACY VISIT (OUTPATIENT)
Dept: PHARMACY | Facility: CLINIC | Age: 25
End: 2025-01-17
Payer: MEDICAID

## 2025-01-17 DIAGNOSIS — R10.2 PELVIC PAIN IN FEMALE: ICD-10-CM

## 2025-01-17 PROCEDURE — 97110 THERAPEUTIC EXERCISES: CPT | Mod: GP | Performed by: PHYSICAL THERAPIST

## 2025-01-17 PROCEDURE — RXMED WILLOW AMBULATORY MEDICATION CHARGE

## 2025-01-17 NOTE — PROGRESS NOTES
I reviewed the resident/fellow's documentation and discussed the patient with the resident/fellow. I agree with the resident/fellow's medical decision making as documented in the note.    Annette Alexander MD

## 2025-01-17 NOTE — DISCHARGE INSTRUCTIONS
You were seen and treated today in the ED for head injury.  CT scan did not reveal any acute intracranial abnormalities.  Your symptoms are likely due to concussion.  It is recommended that you limit activity and rest.  You may gradually increase activity level over the next week.  If you experience symptoms you should stop and rest.  Avoid looking at screens for long periods of time and straining your eyes.  Prescriptions were sent to the pharmacy for Zofran that you may take as prescribed for nausea.  Additionally Tylenol was sent to the pharmacy that you should take as prescribed for pain control.  Follow-up with primary care, phone numbers attached to discharge paperwork for you to call and set up an appointment.  Otherwise you may return to the ED for any new or worsening symptoms including but not limited to fevers, chest pain, shortness of breath.

## 2025-01-17 NOTE — PROGRESS NOTES
"Physical Therapy    PELVIC FLOOR TREATMENT    Name: Clifford Mandujano \"Ariel\"  MRN: [id number]  : 2000  Today's Date: 25     Time Calculation  Start Time: 1055  Stop Time: 1135  Time Calculation (min): 40 min       PT Therapeutic Procedures Time Entry  Therapeutic Exercise Time Entry:        Visit: 2  Insurance: [facility]  Auth: No   30 visits per year     Assessment:   Limited rx today d/t concussion. Pt tolerated session well  Will continue with flexibility work  Declined internal today     Plan:   Continue with flexibility work  Half kneeling flexibility  Deep squat with PF relaxation       Current Problem:  Problem List Items Addressed This Visit             ICD-10-CM    Pelvic pain in female R10.2       Subjective   Pt sustained concussion at work yesterday; feeling tightness into R side of the neck and face; did follow up at ED     Pain has been ramping up a bit over the last few weeks but is intermittent vs constant    Did get surgery scheduled for 3/25       Objective   Mod limitation IR R AROM vs L       TREATMENT  Therapeutic exercise:  Butterfly stretch x 1 min   Happy baby stretch x 1 min   Figure 4 stretch x 1 min   Lumbar rot x 10   Reverse clamshells x 10 ea   Child's pose x 5 with 3 second hold   Cat cow x 10     MHP to R cervical spine/shoulder x 15 min EOS      Careplan Goals:  Problem: PT Problem       Goal: Pt will be independent in urgency control techniques for reduced nocturia to 1x/night for improved sleep quality           Goal: Pt will report reduced pelvic pain by 25% for improved ability to perform daily activities           Goal: Pt will increase pelvic floor flexibility demonstrated by ability to utilize tampon without pain           Goal: Pt will be independent in HEP for home maintenance            Layton Davis, PT  "

## 2025-01-17 NOTE — ED PROVIDER NOTES
"    History of Present Illness       History provided by: Patient  Limitations to History: None    HPI:  HPI   Patient is a 24-year-old female with a history of asthma that presents to the ED for head injury.  She states that yesterday her child was playing with the door and it hit her on the front right side of her head.  She did not lose consciousness.  She does endorse feeling nauseous without any vomiting.  She has concerns for a concussion and is feeling disoriented.  No dizziness or feeling unbalanced.  No changes in vision.  She does endorse a headache on the right side where she was hit.  A while after the injury she noticed some dried blood in her nose without active bleed.  She does report periodic nose bleeds prior to the injury.  She also states that her jaw feels \"off\" but does not know how to describe it.  She has not taken any pain medication.  Denies taking any blood thinners.      Physical Exam   Physical Exam     VS: As documented in the triage note and EMR flowsheet from this visit were reviewed.    Appearance: Alert, oriented, cooperative, in no acute distress. Well nourished & well hydrated.    Skin: Atraumatic. Warm, intact and dry. No swelling, ecchymosis, lesions, rash, or petechiae.    Eyes: PERRLA, EOMs intact. No pain with EOMs. Positive for photophobia. Bilateral conjunctivae pink without injection or exudates. No hyphema or subconjunctival hemorrhage.    ENT: Hearing grossly intact. No drooling, dysphagia or trismus. Voice non-muffled.    Neck: Mild tenderness to palpation over the right lateral neck.  No lymphadenopathy.     Pulmonary: Clear bilaterally with good chest wall excursion. No rales, rhonchi or wheezing. No accessory muscle use or stridor. Nonlabored breathing, no supplemental oxygen.     Cardiac: Normal S1, S2 without murmur, rub, gallop or extrasystole.    Musculoskeletal: Spontaneously moving all extremities without limitation.  Extremities warm and well-perfused, " capillary refill less than 2 seconds. Pulses full and equal. Calves nontender and without palpable cords. No cyanosis, clubbing or deformity noted.    Neurological:  Cranial nerves II through XII are grossly intact, normal sensation, no weakness, no focal findings identified. Ambulating without assistance with steady gait, non-ataxic.    Psychiatric: Appropriate mood and affect. Kempt appearance. Does not appear internally stimulated.             Medical Decision Making & ED Course     ED Course & MDM       Medical Decision Making:  Medical Decision Making     ----  Patient is a 24-year-old female with a history of asthma that presents to the ED for head injury. History obtained from patient. History and physical exam are as documented above.  Patient is slightly tachycardic 102 bpm.  All other vital signs are stable and within normal limits.  Patient was seen and discussed with Dr. Diaz. Differential diagnoses considered include but are not limited to: concussion, subdural hematoma, epidural hematoma, fracture.    Patient is resting comfortably in ED exam chair and does not appear in any acute distress.  She states that she was hit on the right side of her head with a door yesterday.  She has since had a headache with associated without vomiting.  She did not lose consciousness.  No dizziness or vision changes.  Photophobia noted on exam.  Tender to palpation over right lateral neck. workup included head and facial bones without contrast. Patient was treated in ED with Tylenol for symptomatic relief.  Low suspicion for subdural or epidural hematoma, patient is neurovascularly intact. CT was negative for acute intracranial abnormality. Patients symptoms are likely do to concussion she is encouraged to limit activity and rest.  Avoid looking at screens for long periods of time or straining her eyes.  She may gradually increase activity over the next few days as tolerated.  If she experiences symptoms with activity  she is encouraged to rest.  She is given a prescription for Tylenol and Zofran that she may use for symptom control.  She is instructed to follow-up with primary care, referral was placed and the phone number to call and set up an appointment was attached to discharge paperwork.  Otherwise she may return to the ED with any new or worsening symptoms.  Patient is in agreement with this plan and stable for discharge.             Visit Vitals  /90   Pulse (!) 102   Temp 36.8 °C (98.2 °F)   Resp 18   LMP 12/02/2024 (Exact Date)   SpO2 99%   OB Status Having periods   Smoking Status Never        Labs Reviewed - No data to display    No orders to display       ED Course:  Diagnoses as of 01/16/25 2110   Injury of head, initial encounter     Disposition   Discharged in stable condition.    Procedures   Procedures      Ena Gonzalez PA-C  Emergency Medicine      Ena Gonzalez PA-C  01/16/25 2116

## 2025-01-18 ENCOUNTER — PHARMACY VISIT (OUTPATIENT)
Dept: PHARMACY | Facility: CLINIC | Age: 25
End: 2025-01-18
Payer: MEDICAID

## 2025-01-18 ENCOUNTER — HOSPITAL ENCOUNTER (EMERGENCY)
Facility: HOSPITAL | Age: 25
Discharge: HOME | End: 2025-01-18
Attending: EMERGENCY MEDICINE
Payer: COMMERCIAL

## 2025-01-18 VITALS
OXYGEN SATURATION: 99 % | WEIGHT: 159 LBS | RESPIRATION RATE: 16 BRPM | HEART RATE: 97 BPM | DIASTOLIC BLOOD PRESSURE: 80 MMHG | BODY MASS INDEX: 29.26 KG/M2 | HEIGHT: 62 IN | SYSTOLIC BLOOD PRESSURE: 116 MMHG | TEMPERATURE: 97.7 F

## 2025-01-18 DIAGNOSIS — R04.0 EPISTAXIS: ICD-10-CM

## 2025-01-18 DIAGNOSIS — S06.0X0A CONCUSSION WITHOUT LOSS OF CONSCIOUSNESS, INITIAL ENCOUNTER: Primary | ICD-10-CM

## 2025-01-18 PROCEDURE — 99282 EMERGENCY DEPT VISIT SF MDM: CPT | Performed by: EMERGENCY MEDICINE

## 2025-01-18 PROCEDURE — 99284 EMERGENCY DEPT VISIT MOD MDM: CPT | Performed by: NURSE PRACTITIONER

## 2025-01-18 PROCEDURE — RXMED WILLOW AMBULATORY MEDICATION CHARGE

## 2025-01-18 RX ORDER — OXYMETAZOLINE HCL 0.05 %
2 SPRAY, NON-AEROSOL (ML) NASAL EVERY 12 HOURS PRN
Qty: 30 ML | Refills: 0 | Status: SHIPPED | OUTPATIENT
Start: 2025-01-18 | End: 2025-06-17

## 2025-01-18 RX ORDER — MECLIZINE HYDROCHLORIDE 25 MG/1
25 TABLET ORAL 3 TIMES DAILY PRN
Qty: 30 TABLET | Refills: 0 | Status: SHIPPED | OUTPATIENT
Start: 2025-01-18 | End: 2025-01-28

## 2025-01-18 ASSESSMENT — PAIN DESCRIPTION - LOCATION: LOCATION: HEAD

## 2025-01-18 ASSESSMENT — PAIN - FUNCTIONAL ASSESSMENT: PAIN_FUNCTIONAL_ASSESSMENT: 0-10

## 2025-01-18 ASSESSMENT — PAIN SCALES - GENERAL: PAINLEVEL_OUTOF10: 6

## 2025-01-18 ASSESSMENT — COLUMBIA-SUICIDE SEVERITY RATING SCALE - C-SSRS
6. HAVE YOU EVER DONE ANYTHING, STARTED TO DO ANYTHING, OR PREPARED TO DO ANYTHING TO END YOUR LIFE?: NO
2. HAVE YOU ACTUALLY HAD ANY THOUGHTS OF KILLING YOURSELF?: NO
1. IN THE PAST MONTH, HAVE YOU WISHED YOU WERE DEAD OR WISHED YOU COULD GO TO SLEEP AND NOT WAKE UP?: NO

## 2025-01-18 NOTE — ED PROVIDER NOTES
EMERGENCY DEPARTMENT ENCOUNTER      Pt Name: Clifford Mandujano  MRN: [id number]  Birthdate 2000  Date of evaluation: 1/18/2025  Provider: ALEXANDRU Mejía    CHIEF COMPLAINT       Chief Complaint   Patient presents with    Epistaxis (Nose Bleed)    Headache       HISTORY OF PRESENT ILLNESS    Clifford Mandujano is a 24 y.o. female who presents to the emergency department with complaint of continued headache and epistaxis onset a couple days ago after sustaining a head injury.  Patient states that her kid was playing with the door and hit her on the right side of her head.  She not lose consciousness at the time.  She was seen in our emergency department 2 days ago and states her symptoms have not improved despite taking ibuprofen, Tylenol and Zofran which she was prescribed 2 days ago.  States that she is concerned because her symptoms have not improved.  She does sleep with the windows open.  She denies any trauma, fall, injury or anticoagulation use.  Denies any neck pain, numbness, tingling, weakness, dizziness, shortness of breath, inability to ambulate, incontinence of bowel bladder or any additional symptoms or complaints this time.    Nursing Notes were reviewed.    History provided by patient.  No  used.    REVIEW OF SYSTEMS     ROS is otherwise negative unless stated above.    PAST MEDICAL HISTORY     Past Medical History:   Diagnosis Date    Chlamydial infection, unspecified 08/03/2020    Chlamydia    Dysmenorrhea, unspecified 10/11/2018    Adolescent dysmenorrhea    Encounter for immunization     Immunization due    Encounter for other specified special examinations     Diagnostic skin test    Encounter for routine child health examination with abnormal findings 08/08/2018    Encounter for routine child health examination with abnormal findings    Encounter for surveillance of contraceptive pills 01/31/2021    Encounter for surveillance of contraceptive pills     Hypermetropia, right eye 05/29/2019    Hypermetropia of right eye    Other conditions influencing health status     Full-term infant    Other diseases of vocal cords     Vocal cord dysfunction    Personal history of diseases of the skin and subcutaneous tissue 06/28/2018    History of acne    Personal history of other diseases of the nervous system and sense organs     History of migraine    Personal history of other endocrine, nutritional and metabolic disease 05/06/2015    History of vitamin D deficiency    Personal history of other infectious and parasitic diseases 08/07/2018    History of candidiasis of vagina    Personal history of other specified conditions 04/29/2020    History of epistaxis    Personal history of other specified conditions 03/07/2016    History of headache    Unspecified asthma with (acute) exacerbation ([facility]) 11/03/2014    Asthma exacerbation       SURGICAL HISTORY     No past surgical history on file.    ALLERGIES     Cat dander, Dog dander, Grass pollen, Histamine, Histamine phosphate, Ragweed, and Weed pollen-short ragweed    FAMILY HISTORY       Family History   Problem Relation Name Age of Onset    Fibroids Mother          had genetic testing for Cordero syndrome which was negative    Endometrial cancer Maternal Grandmother      Stroke Maternal Grandmother      Ovarian cancer Neg Hx      Colon cancer Neg Hx          SOCIAL HISTORY       Social History     Socioeconomic History    Marital status: Single   Tobacco Use    Smoking status: Never    Smokeless tobacco: Never   Vaping Use    Vaping status: Never Used   Substance and Sexual Activity    Alcohol use: Not Currently     Alcohol/week: 0.0 - 1.0 standard drinks of alcohol     Comment: social    Drug use: Never    Sexual activity: Not Currently       PHYSICAL EXAM   VS: As documented in the triage note and EMR flowsheet from this visit were reviewed.    GEN: NAD, nontoxic, well-appearing, ambulates without difficulty  EYES:  EOMs  "grossly intact, anicteric sclera, no nystagmus noted, clear and equal bilaterally, no foreign body noted  HEENT: Airway patent  CARD: RRR, nontender chest, no crepitus deformities, no JVD, no murmurs rubs or gallops ; No edema noted.  Positive pulses bilaterally throughout.  Capillary refill less than 3 seconds.  No abnormal redness, warmth, tenderness or swelling noted to bilateral lower extremities.  MUSK: Spine appears normal, range of motion is not limited, no muscle or joint tenderness. Strength 5 out of 5 equal bilaterally throughout.  No step-offs, deformities or additional signs of trauma noted.  No spinal/midline tenderness to palpation  SKIN: Skin normal color for race, warm, dry and intact. No evidence of trauma. No rash noted.  NEURO: Alert and oriented x 3, speech is clear, no obvious deficits noted. No facial droop noted.      DIAGNOSTIC RESULTS   RADIOLOGY:   Non-plain film images such as CT, Ultrasound and MRI are read by the radiologist. Plain radiographic images are visualized and preliminarily interpreted by myself with the below findings: None      Interpretation per the Radiologist below, if available at the time of this note:    No orders to display         ED BEDSIDE ULTRASOUND:   Performed by myself - none    LABS:  Labs Reviewed - No data to display    All other labs were within normal range or not returned as of this dictation.    EMERGENCY DEPARTMENT COURSE/MDM:   Vitals:    Vitals:    01/18/25 0957   BP: 116/80   Pulse: 97   Resp: 16   Temp: 36.5 °C (97.7 °F)   TempSrc: Temporal   SpO2: 99%   Weight: 72.1 kg (159 lb)   Height: 1.575 m (5' 2\")       I reviewed the patient's triage vitals.    This is a 24-year-old female who represents for evaluation of headache and epistaxis.  She is neurologically intact and ambulatory at her baseline.  She has no active epistaxis at this time.  She is otherwise well-appearing with a benign and reassuring assessment.  I had a shared educational discussion " with the patient regarding signs and symptoms of concussions and how long they last and she verbalized understanding.  Additionally I do not believe she has any acute blood loss anemia and she has no bleeding risk factors.  She is educated to obtain a humidifier and on additional symptomatic and supportive care at home.  Educated that she is likely drying out the mucous membranes in her nose due to the cold air.  Educated to change positions slowly if she becomes lightheaded and to maintain proper rest and hydration.  She remained hemodynamically stable.      Diagnoses as of 01/18/25 1212   Concussion without loss of consciousness, initial encounter   Epistaxis       Patient was counseled regarding labs, imaging, likely diagnosis, and plan. All questions were answered.     ------------------------------------------------------------------  EKG Interpretation: N/A    Differential Diagnoses Considered: Epistaxis, dry nose, concussion, headache, anxiety    Chronic Medical Conditions Significantly Affecting Care: None    External Records Reviewed: I reviewed recent and relevant outside records including: PCP notes, prior discharge summary, previous radiologic studies, HIE; reviewed her workup and negative imaging studies from 2 days ago there is no indication to repeat these at this    Escalation of Care:  Appropriate for outpatient management with primary care provider follow-up in the next week for reevaluation.  Additionally provided referrals for ENT and concussion clinic if her symptoms persist return precautions discussed and patient verbalized understanding. All questions and concerns were addressed.    Social Determinants of Health Significantly Affecting Care:  None    Prescription Drug Consideration: Meclizine p.o. and Afrin nasal spray for further management home.  Educated to continue her already prescribed ibuprofen, Tylenol and Zofran ODT    Diagnostic testing considered: No additional indicated at this  time    Discussion of Management with Other Providers:   I discussed the patient/results with: None      ------------------------------------------------------------------  ED Medications administered this visit:  Medications - No data to display    New Prescriptions from this visit:    Discharge Medication List as of 1/18/2025 10:50 AM        START taking these medications    Details   meclizine (Antivert) 25 mg tablet Take 1 tablet (25 mg) by mouth 3 times a day as needed for dizziness for up to 10 days., Starting Sat 1/18/2025, Until Tue 1/28/2025 at 2359, Normal      oxymetazoline (Afrin) 0.05 % nasal spray Administer 2 sprays into each nostril every 12 hours if needed for congestion for up to 2 days. Do not use for more than 3 days., Starting Sat 1/18/2025, Until Tue 6/17/2025 at 2359, Normal             Follow-up:  Iftikhar Zamora DO  [id number] [place] [id number] [phone]    Schedule an appointment as soon as possible for a visit in 1 week  reevaluation        Final Impression:   1. Concussion without loss of consciousness, initial encounter    2. Epistaxis          JUAN DIEGO Mejía-CNP    This patient was staffed with ED Attending Dr. Diaz to review the plan of care during ED course.    (Please note that portions of this note were completed with a voice recognition program.  Efforts were made to edit the dictations but occasionally words are mis-transcribed.)     JUAN DIEGO Mejía-TERESITA  01/18/25 4388

## 2025-01-18 NOTE — ED TRIAGE NOTES
Pt presents to ED for nose bleed and HA. Pt states recent dx with concussion (1/16), states she feels she is not getting better. Pt states the nose bleeds never stop, that there is always blood when she blows her nose. No nose bleed noted in triage. Pt also c/o that it is painful to chew food on R side.

## 2025-01-20 ENCOUNTER — APPOINTMENT (OUTPATIENT)
Dept: INFUSION THERAPY | Facility: CLINIC | Age: 25
End: 2025-01-20
Payer: COMMERCIAL

## 2025-01-20 ENCOUNTER — DOCUMENTATION (OUTPATIENT)
Dept: INFUSION THERAPY | Facility: CLINIC | Age: 25
End: 2025-01-20

## 2025-01-20 NOTE — PROGRESS NOTES
Patient to be scheduled for venofer infusions.     For Diagnosis: Iron Deficiency Anemia    POCT Urine pregnancy test ordered prior to first infusion?  Yes     Review of Labs:  Lab Results   Component Value Date    HGB 9.9 (L) 01/06/2025    FERRITIN 20 01/06/2025    RETICHGB 26 (L) 01/06/2025    RETIC 0.065 01/06/2025    MCHC 31.3 (L) 01/06/2025    MCV 79 (L) 01/06/2025    MCH 24.6 (L) 01/06/2025        Do labs confirm diagnosis of iron deficiency? Yes          Okay to schedule for treatment as ordered by prescribing provider.      Tierra Candelario, JUAN DIEGO-CNP    [facility] ([facility])  [address] [place] [address], [place] [address]  Phone: [phone]

## 2025-01-21 ENCOUNTER — HOSPITAL ENCOUNTER (OUTPATIENT)
Dept: DIALYSIS | Facility: HOSPITAL | Age: 25
Setting detail: DIALYSIS SERIES
Discharge: HOME | End: 2025-01-21
Payer: COMMERCIAL

## 2025-01-21 VITALS
RESPIRATION RATE: 18 BRPM | BODY MASS INDEX: 28.83 KG/M2 | DIASTOLIC BLOOD PRESSURE: 79 MMHG | TEMPERATURE: 97.7 F | SYSTOLIC BLOOD PRESSURE: 113 MMHG | WEIGHT: 157.63 LBS | HEART RATE: 70 BPM

## 2025-01-21 DIAGNOSIS — N80.03 ADENOMYOSIS: Primary | ICD-10-CM

## 2025-01-21 PROCEDURE — 2500000004 HC RX 250 GENERAL PHARMACY W/ HCPCS (ALT 636 FOR OP/ED): Mod: SE | Performed by: STUDENT IN AN ORGANIZED HEALTH CARE EDUCATION/TRAINING PROGRAM

## 2025-01-21 PROCEDURE — 96366 THER/PROPH/DIAG IV INF ADDON: CPT

## 2025-01-21 PROCEDURE — 96367 TX/PROPH/DG ADDL SEQ IV INF: CPT

## 2025-01-21 RX ORDER — DIPHENHYDRAMINE HYDROCHLORIDE 50 MG/ML
50 INJECTION INTRAMUSCULAR; INTRAVENOUS AS NEEDED
OUTPATIENT
Start: 2025-01-25

## 2025-01-21 RX ORDER — FAMOTIDINE 10 MG/ML
20 INJECTION INTRAVENOUS ONCE AS NEEDED
OUTPATIENT
Start: 2025-01-25

## 2025-01-21 RX ORDER — EPINEPHRINE 1 MG/ML
0.3 INJECTION, SOLUTION, CONCENTRATE INTRAVENOUS EVERY 5 MIN PRN
Status: DISCONTINUED | OUTPATIENT
Start: 2025-01-21 | End: 2025-01-22 | Stop reason: HOSPADM

## 2025-01-21 RX ORDER — DIPHENHYDRAMINE HYDROCHLORIDE 50 MG/ML
50 INJECTION INTRAMUSCULAR; INTRAVENOUS AS NEEDED
Status: DISCONTINUED | OUTPATIENT
Start: 2025-01-21 | End: 2025-01-22 | Stop reason: HOSPADM

## 2025-01-21 RX ORDER — EPINEPHRINE 1 MG/ML
0.3 INJECTION, SOLUTION, CONCENTRATE INTRAVENOUS EVERY 5 MIN PRN
OUTPATIENT
Start: 2025-01-25

## 2025-01-21 RX ORDER — FAMOTIDINE 10 MG/ML
20 INJECTION INTRAVENOUS ONCE AS NEEDED
Status: DISCONTINUED | OUTPATIENT
Start: 2025-01-21 | End: 2025-01-22 | Stop reason: HOSPADM

## 2025-01-21 RX ORDER — ALBUTEROL SULFATE 0.83 MG/ML
3 SOLUTION RESPIRATORY (INHALATION) AS NEEDED
Status: DISCONTINUED | OUTPATIENT
Start: 2025-01-21 | End: 2025-01-22 | Stop reason: HOSPADM

## 2025-01-21 RX ORDER — ALBUTEROL SULFATE 0.83 MG/ML
3 SOLUTION RESPIRATORY (INHALATION) AS NEEDED
OUTPATIENT
Start: 2025-01-25

## 2025-01-21 RX ADMIN — IRON SUCROSE 300 MG: 20 INJECTION, SOLUTION INTRAVENOUS at 09:56

## 2025-01-21 ASSESSMENT — PAIN SCALES - GENERAL: PAINLEVEL_OUTOF10: 4

## 2025-01-21 NOTE — PROGRESS NOTES
..  [facility]   Infusion Clinic Note   Date: 2025   Name: Clifford Mandujano  : 2000   MRN: [id number]         Reason for Visit:   Venofer    Ferritin   Date/Time Value Ref Range Status   2025 01:41 PM 20 8 - 150 ng/mL Final       Accompanied by:Relative   Visit Type:: Infusion   Diagnosis: Adenomyosis    Allergies:   Allergies as of 2025 - Reviewed 2025   Allergen Reaction Noted    Cat dander Unknown 10/10/2024    Dog dander Unknown 10/10/2024    Grass pollen Unknown 2024    Histamine Unknown 2024    Histamine phosphate Unknown 10/10/2024    Ragweed Unknown 10/10/2024    Weed pollen-short ragweed Unknown 2024      Current Meds [unfilled]        Vitals:  Vitals:    25 0930   BP: 125/80   BP Location: Left arm   Patient Position: Sitting   BP Cuff Size: Adult   Pulse: 95   Temp: 36.5 °C (97.7 °F)   Weight: 71.5 kg (157 lb 10.1 oz)      Infusion Pre-procedure Checklist   Allergies reviewed: yes, NKDA per pt and her mother    Medications reviewed: yes   Contraindications to treatment:No   Previous reaction to current treatment:No   Current Health Issues: None and Planned Surgery 2025 fibroid removal    Pain: 4 [4]'    Is the pain different from normal: No   Is the pain tolerable: yes   Is your Doctor aware: yes   Contraindications based on patient history: No   Provider notified: Not applicable   Labs: Labs reviewed   Fall Risk Screening:      Review of Systems - Oncology   Negative for complaint: [] all other systems have been reviewed and are negative for complaint   Infusion Readiness:   Assessment Concerns Related to Infusion: No  Provider notified: n/a  Yes, Dr Sanchez notified of recent concussion.    Assess patient for the concerns below. Document provider notification as appropriate:  - Does not meet criteria to treat No, she does meet criteria  - Has an active or recent infection with/without current antibiotic use  No  - Has recent/planned dental work No, has dental caries that need filled.   - Has recent/planned surgeries Yes  - Has recently received or plans to receive vaccinations No  - Has treatment related toxicities No  - Is pregnant (unless noted otherwise) No  Education teaching sheet on iron administration taken from [employer] provided to patient.  Pt tolerated well IV removed intact.  Plan for pt to return 1/28/25 for next iron infusion.   Initiated By: Renata Fernández RN   Time: 9:34 AM     [unfilled]

## 2025-01-23 ENCOUNTER — OFFICE VISIT (OUTPATIENT)
Dept: PRIMARY CARE | Facility: CLINIC | Age: 25
End: 2025-01-23
Payer: COMMERCIAL

## 2025-01-23 VITALS — HEIGHT: 62 IN | BODY MASS INDEX: 28.83 KG/M2

## 2025-01-23 DIAGNOSIS — S06.0X0A CONCUSSION WITHOUT LOSS OF CONSCIOUSNESS, INITIAL ENCOUNTER: ICD-10-CM

## 2025-01-23 PROCEDURE — 1036F TOBACCO NON-USER: CPT | Performed by: FAMILY MEDICINE

## 2025-01-23 PROCEDURE — 99203 OFFICE O/P NEW LOW 30 MIN: CPT | Performed by: FAMILY MEDICINE

## 2025-01-23 ASSESSMENT — ENCOUNTER SYMPTOMS
DEPRESSION: 0
LOSS OF SENSATION IN FEET: 0
OCCASIONAL FEELINGS OF UNSTEADINESS: 0

## 2025-01-23 NOTE — LETTER
January 23, 2025     Patient: Clifford Mandujano   YOB: 2000   Date of Visit: 1/23/2025       To Whom It May Concern:    Clifford Mandujano was seen in my clinic on 1/23/2025 at 11:00 am. Please excuse Clifford for her absence from work on this day to make the appointment.    you may continue to work as tolerated, continuing to work with concussion symptoms may slow your recovery.     If you have any questions or concerns, please don't hesitate to call.         Sincerely,         Donnie Lombardi,         CC: No Recipients

## 2025-01-23 NOTE — PROGRESS NOTES
Concussion     Clifford Mandujano is a 24 year old female presenting to concussion clinic for evaluation.       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 When she turned around, a door hit her in the face. Pt closed the door and she continued with work. Her head was hurting at the time, her nose was bleeding later in the day.     Jan 16, 2025- she was dizzy and nauseous and her head was hurting. She went to the ER. Scans were done. Discharged home.    Jan 18, 2025- her nose was still bleeding so she went back to the ER.         Headache- just feels like she has a constant headache.   Pressure in head- improving, but still feels constantly.   Sensitivity to light- mostly with bright lights.   Feeling slowed down- like she can't do a much as she usually does..   Don't feel right- she feels off.   Difficulty concentrating- she makes people repeat things.   Difficulty remembering- sometimes at work, and with conversations.   Confusion- sometimes at work  Trouble falling asleep- since the injury- takes 'forever' for her to get to sleep.         Work: [employer].   School: none  Sports: usually likes to go to the gym.   Paperwork: no injury report filled out at work. No EMS. Not [facility]. No attorneys.       Healthcare team:  - ENT appt Jan 28  - PCP        Date of current head injury:   - Abhijeet 15, 2025  Previous concussion history:  - none  Imaging for a head injury/concussion:  - CT head Jan 16, 2025- no acute findings  Depression, anxiety, psychiatric disorder, learning disability, dyslexia, ADD/ADHD?:  - depression/anxiety. Previously had a therapist, she may be in the process to determine if she has autism. This is being directed by her PCP.   Headache history:   - consistent headaches in her youth.       Concussion symptoms: severity 0 to 6    Headache 1  Pressure in head 3  Neck pain 0  Nausea or vomiting 0  Dizziness 0  Blurred vision 0  Balance problems  0  Sensitivity to light 3  Sensitivity to noise 0  Feeling slowed down 1  Feeling like in a fog 0  Don't feel right 2  Difficulty concentrating 3  Difficulty remembering 3  Fatigue or low energy 0  Confusion 1  Drowsiness 0  More emotional 0  Irritability 0  Sadness 0  Nervous or Anxious 0  Trouble falling asleep 5    There were no vitals taken for this visit.     Gen: NAD, Alert and oriented x3  Head: right scalp ttp; no step offs  Face: no specific areas of ttp; no step offs  Neck: Generalized posterior soft tissue ttp.   Psych: mood pleasant and appropriate  Resp: good respiratory effort  Neurologic: CN II-XII grossly intact. Strength and sensation symmetric and intact throughout all 4 extremities. DTR 2+ in all 4 extremities. Cerebellar testing normal. Negative Rhomberg's test. No dysdiadochokinesis.  Gait: normal                CT head wo IV contrast, CT maxillofacial bones wo IV contrast  Narrative: Interpreted By:  Daniel Ambrosio,   STUDY:  CT HEAD WO IV CONTRAST; CT FACIAL BONES WO IV CONTRAST;  1/16/2025  8:16 pm      INDICATION:  Signs/Symptoms:head injury; Signs/Symptoms:head injury, she feels  that her teeth aren't aligned. Altered mental status      COMPARISON:  None.      ACCESSION NUMBER(S):  [id number]; [id number]      ORDERING CLINICIAN:  ROSENDO FLEMING      TECHNIQUE:  Axial noncontrast CT images of the head and facial bones. 3D volume  rendering of the facial bones was obtained on a separate workstation.      FINDINGS:  BRAIN PARENCHYMA: Gray-white matter interfaces are preserved. No  mass, mass effect or midline shift. No focal brain parenchymal  abnormality.      HEMORRHAGE: No acute intracranial hemorrhage.  VENTRICLES and EXTRA-AXIAL SPACES: Normal size for age.  EXTRACRANIAL SOFT TISSUES: Unremarkable.  CALVARIUM: No depressed calvarial fracture.      FACIAL BONES: No acute facial bone fracture.  SOFT TISSUES: Within normal limits.  PARANASAL SINUSES: No hemorrhage in the paranasal  sinuses. Moderate  polypoid mucosal thickening of the right greater than left frontal  sinuses, right ethmoid air cells, and right frontal sinus. Mild  mucosal thickening of the left maxillary sinus and of the bilateral  sphenoid sinuses. Foot is. MASTOIDS: Within normal limits.  ORBITS: The globes, extraocular muscles and optic nerve sheath  complexes are symmetric. No retrobulbar hematoma.              OTHER FINDINGS: None.          Impression: No acute intracranial abnormality.      No evidence of facial bone fracture.  Moderate right greater than left polypoid mucosal thickening of the  paranasal sinuses. Clinical correlation for sinusitis recommended.      Signed by: Daniel Ambrosio 1/16/2025 8:35 PM  Dictation workstation:   [id number]      No MRI head results found for the past 12 months     CT head wo IV contrast    Result Date: 1/16/2025  Interpreted By:  Daniel Ambrosio, STUDY: CT HEAD WO IV CONTRAST; CT FACIAL BONES WO IV CONTRAST;  1/16/2025 8:16 pm   INDICATION: Signs/Symptoms:head injury; Signs/Symptoms:head injury, she feels that her teeth aren't aligned. Altered mental status   COMPARISON: None.   ACCESSION NUMBER(S): [id number]; [id number]   ORDERING CLINICIAN: ROSENDO FLEMING   TECHNIQUE: Axial noncontrast CT images of the head and facial bones. 3D volume rendering of the facial bones was obtained on a separate workstation.   FINDINGS: BRAIN PARENCHYMA: Gray-white matter interfaces are preserved. No mass, mass effect or midline shift. No focal brain parenchymal abnormality.   HEMORRHAGE: No acute intracranial hemorrhage. VENTRICLES and EXTRA-AXIAL SPACES: Normal size for age. EXTRACRANIAL SOFT TISSUES: Unremarkable. CALVARIUM: No depressed calvarial fracture.   FACIAL BONES: No acute facial bone fracture. SOFT TISSUES: Within normal limits. PARANASAL SINUSES: No hemorrhage in the paranasal sinuses. Moderate polypoid mucosal thickening of the right greater than left frontal sinuses, right ethmoid  air cells, and right frontal sinus. Mild mucosal thickening of the left maxillary sinus and of the bilateral sphenoid sinuses. Foot is. MASTOIDS: Within normal limits. ORBITS: The globes, extraocular muscles and optic nerve sheath complexes are symmetric. No retrobulbar hematoma.       OTHER FINDINGS: None.         No acute intracranial abnormality.   No evidence of facial bone fracture. Moderate right greater than left polypoid mucosal thickening of the paranasal sinuses. Clinical correlation for sinusitis recommended.   Signed by: Daniel Ambrosio 1/16/2025 8:35 PM Dictation workstation:   [id number]          1. Concussion without loss of consciousness, initial encounter  Referral to Concussion Specialist            New concussion visit.    You have suffered a concussion which is an injury to the brain that takes a variable amount of time to recover.   Relative rest is the best treatment, but physical activity that does not worsen your symptoms can be an important part of recovery.     Your concussion symptoms are mild to moderate.     Your condition may transition to post concussion syndrome    Your recovery may be slowed by the risk factors we discussed.    I recommend limiting screen time. No more than 2 hours/day of total screen time is a reasonable amount.      Tylenol or ibuprofen are ok for headaches.       No strenuous physical activity for the time being, such as heavy lifting or intense exercise.     You should avoid activities that place you at risk for sustaining another head injury.    No work note today.    Work modifications include: you may continue to work as tolerated, continuing to work with concussion symptoms may slow your recovery.     I can complete [facility] paperwork and fax it to the number you provide to the office.     I recommend establishing with a provider that specializes in BWC cases. I will happily stay on as the concussion specialist and make my recommendations during your recovery.  "Before your first [facility] visit- most offices will require you to have the \"First Report of Injury\" completed by an ER or urgent care facility. Consider:    Dr. Chiki Yeager  [facility]  [address] [place] [address].  [place] [address]  Patients can call the central scheduling number at [phone] and request to see Dr. Chiki Yeager.       Dr. Sergey Busby  Location: [facility] (Monday, Thursday)  [facility]  [address] [place] [address], [address]  Patients can call the central scheduling number at [phone] and request to see Dr. Sergey Busby.       Follow up with Dr. Lombardi: 2-3 weeks.     If you are not improving by the time you follow up- we will consider a referral to physical therapy.    If physical therapy and rest do not help you fully recover, another step in the management of your head injury may be a referral to neuropsychology.     Keep your ENT appt.   "

## 2025-01-25 ENCOUNTER — PHARMACY VISIT (OUTPATIENT)
Dept: PHARMACY | Facility: CLINIC | Age: 25
End: 2025-01-25
Payer: MEDICAID

## 2025-01-25 PROCEDURE — RXMED WILLOW AMBULATORY MEDICATION CHARGE

## 2025-01-27 ENCOUNTER — APPOINTMENT (OUTPATIENT)
Dept: INFUSION THERAPY | Facility: CLINIC | Age: 25
End: 2025-01-27
Payer: COMMERCIAL

## 2025-01-28 ENCOUNTER — APPOINTMENT (OUTPATIENT)
Dept: OTOLARYNGOLOGY | Facility: CLINIC | Age: 25
End: 2025-01-28
Payer: COMMERCIAL

## 2025-01-28 ENCOUNTER — HOSPITAL ENCOUNTER (OUTPATIENT)
Dept: DIALYSIS | Facility: HOSPITAL | Age: 25
Setting detail: DIALYSIS SERIES
Discharge: HOME | End: 2025-01-28
Payer: COMMERCIAL

## 2025-01-28 VITALS — BODY MASS INDEX: 29.26 KG/M2 | WEIGHT: 159 LBS | HEIGHT: 62 IN

## 2025-01-28 VITALS
WEIGHT: 159.17 LBS | DIASTOLIC BLOOD PRESSURE: 75 MMHG | BODY MASS INDEX: 29.11 KG/M2 | HEART RATE: 81 BPM | TEMPERATURE: 97.9 F | SYSTOLIC BLOOD PRESSURE: 111 MMHG

## 2025-01-28 DIAGNOSIS — J32.4 CHRONIC PANSINUSITIS: Primary | ICD-10-CM

## 2025-01-28 DIAGNOSIS — N80.03 ADENOMYOSIS: Primary | ICD-10-CM

## 2025-01-28 DIAGNOSIS — J34.89 NASAL AND SINUS DISCHARGE: ICD-10-CM

## 2025-01-28 DIAGNOSIS — R04.0 EPISTAXIS: ICD-10-CM

## 2025-01-28 PROCEDURE — 2500000004 HC RX 250 GENERAL PHARMACY W/ HCPCS (ALT 636 FOR OP/ED): Mod: SE | Performed by: STUDENT IN AN ORGANIZED HEALTH CARE EDUCATION/TRAINING PROGRAM

## 2025-01-28 PROCEDURE — 1036F TOBACCO NON-USER: CPT | Performed by: NURSE PRACTITIONER

## 2025-01-28 PROCEDURE — 31231 NASAL ENDOSCOPY DX: CPT | Performed by: NURSE PRACTITIONER

## 2025-01-28 PROCEDURE — 3008F BODY MASS INDEX DOCD: CPT | Performed by: NURSE PRACTITIONER

## 2025-01-28 PROCEDURE — 99244 OFF/OP CNSLTJ NEW/EST MOD 40: CPT | Performed by: NURSE PRACTITIONER

## 2025-01-28 PROCEDURE — RXMED WILLOW AMBULATORY MEDICATION CHARGE

## 2025-01-28 RX ORDER — DIPHENHYDRAMINE HYDROCHLORIDE 50 MG/ML
50 INJECTION INTRAMUSCULAR; INTRAVENOUS AS NEEDED
OUTPATIENT
Start: 2025-01-29

## 2025-01-28 RX ORDER — DIPHENHYDRAMINE HYDROCHLORIDE 50 MG/ML
50 INJECTION INTRAMUSCULAR; INTRAVENOUS AS NEEDED
Status: DISCONTINUED | OUTPATIENT
Start: 2025-01-28 | End: 2025-01-29 | Stop reason: HOSPADM

## 2025-01-28 RX ORDER — ALBUTEROL SULFATE 0.83 MG/ML
3 SOLUTION RESPIRATORY (INHALATION) AS NEEDED
Status: DISCONTINUED | OUTPATIENT
Start: 2025-01-28 | End: 2025-01-29 | Stop reason: HOSPADM

## 2025-01-28 RX ORDER — EPINEPHRINE 1 MG/ML
0.3 INJECTION, SOLUTION, CONCENTRATE INTRAVENOUS EVERY 5 MIN PRN
Status: DISCONTINUED | OUTPATIENT
Start: 2025-01-28 | End: 2025-01-29 | Stop reason: HOSPADM

## 2025-01-28 RX ORDER — ALBUTEROL SULFATE 0.83 MG/ML
3 SOLUTION RESPIRATORY (INHALATION) AS NEEDED
OUTPATIENT
Start: 2025-01-29

## 2025-01-28 RX ORDER — MUPIROCIN 20 MG/G
1 OINTMENT TOPICAL
Qty: 22 G | Refills: 0 | Status: SHIPPED | OUTPATIENT
Start: 2025-01-28 | End: 2025-05-08

## 2025-01-28 RX ORDER — EPINEPHRINE 1 MG/ML
0.3 INJECTION, SOLUTION, CONCENTRATE INTRAVENOUS EVERY 5 MIN PRN
OUTPATIENT
Start: 2025-01-29

## 2025-01-28 RX ORDER — FAMOTIDINE 10 MG/ML
20 INJECTION INTRAVENOUS ONCE AS NEEDED
OUTPATIENT
Start: 2025-01-29

## 2025-01-28 RX ORDER — FAMOTIDINE 10 MG/ML
20 INJECTION INTRAVENOUS ONCE AS NEEDED
Status: DISCONTINUED | OUTPATIENT
Start: 2025-01-28 | End: 2025-01-29 | Stop reason: HOSPADM

## 2025-01-28 RX ORDER — DOXYCYCLINE 100 MG/1
100 TABLET ORAL 2 TIMES DAILY
Qty: 20 TABLET | Refills: 0 | Status: SHIPPED | OUTPATIENT
Start: 2025-01-28 | End: 2025-02-07

## 2025-01-28 RX ADMIN — IRON SUCROSE 300 MG: 20 INJECTION, SOLUTION INTRAVENOUS at 09:43

## 2025-01-28 ASSESSMENT — PATIENT HEALTH QUESTIONNAIRE - PHQ9
1. LITTLE INTEREST OR PLEASURE IN DOING THINGS: NOT AT ALL
2. FEELING DOWN, DEPRESSED OR HOPELESS: NOT AT ALL
SUM OF ALL RESPONSES TO PHQ9 QUESTIONS 1 AND 2: 0

## 2025-01-28 NOTE — PROGRESS NOTES
"[facility]   Infusion Clinic Note   Date: 2025   Name: Clifford Mandujano  : 2000   MRN: [id number]         Reason for Visit: . (Venofer 300mg )      Accompanied by:Parent   Visit Type:: Infusion   Diagnosis: Adenomyosis    Allergies:   Allergies as of 2025 - Reviewed 2025   Allergen Reaction Noted    Cat dander Unknown 10/10/2024    Dog dander Unknown 10/10/2024    Grass pollen Unknown 2024    Histamine Unknown 2024    Histamine phosphate Unknown 10/10/2024    Ragweed Unknown 10/10/2024    Weed pollen-short ragweed Unknown 2024      Current Meds [unfilled]        Vitals:  Vitals:    25   BP: 112/74   BP Location: Left arm   Patient Position: Sitting   BP Cuff Size: Adult   Pulse: 84   Temp: 36.1 °C (97 °F)   TempSrc: Tympanic   Weight: 72.2 kg (159 lb 2.8 oz)      Infusion Pre-procedure Checklist   Allergies reviewed: yes   Medications reviewed: yes   Contraindications to treatment:{YES OR NO - DEFAULT NO:97064}   Previous reaction to current treatment:{YES OR NO - DEFAULT NO:94626}   Current Health Issues: {current health issues-default None:76273::\"None\"}   Pain: '    Is the pain different from normal: No   Is the pain tolerable: yes   Is your Doctor aware: yes   Contraindications based on patient history: No   Provider notified: Not applicable   Labs: None  Labs reviewed      Review of Systems - Oncology   Negative for complaint: [] all other systems have been reviewed and are negative for complaint   Infusion Readiness:   Assessment Concerns Related to Infusion: No  Provider notified: n/a  Assess patient for the concerns below. Document provider notification as appropriate:  - Does not meet criteria to treat N/A  - Has an active or recent infection with/without current antibiotic use No  - Has recent/planned dental work No  - Has recent/planned surgeries Yes 3/25/25, Fibroid surgery  - Has recently received or plans to receive " vaccinations No  - Has treatment related toxicities No  - Is pregnant (unless noted otherwise) No    Initiated By: Sharon Green RN   Time: 9:23 AM     [unfilled]

## 2025-01-28 NOTE — PROGRESS NOTES
"Subjective   Patient ID: Clifford Mandujano \"Ariel\" is a 24 y.o. female who presents for Epistaxis (Nose Bleed).  HPI  Clifford Mandujano \"Zaheera\"  is a 24 y.o. old female, referred by Eliane Antonio CNP, who presents for evaluation of epistaxis or nosebleeds that started on the left side about 2 weeks ago. Patient notes that her nose has been bleeding consistently since a facial injury 2 weeks ago. She stops the bleeding by inserting a tissue in her nose. Prior intervention for nasal bleeding includes nothing. The patient has history of epistaxis. The last episode began on the left side about a few days ago.  The patient has been to the ER for the nosebleeds. The nose has not been cauterized in the past. Patient reports taking the following anticoagulants/antiplatelet agents: denies.      Patient denies hx of nasal surgery or trauma. No history of uncontrolled hypertension. Pt has no history of significant exposure to toxic fumes, nickel, or wood dust.  Patient denies intranasal steroid use, repetitive digital trauma, or intranasal drug use.     She was using flonase until the injury, has since stopped secondary to the bleeding.     Patient denies any other sinonasal complaints, however, notes a history of allergies. She denies frequent sinus infections. Since the injury, she has had increased yellow drainage on the right side.     I personally reviewed the patients CT scan images and results. I discussed the results personally with the patient. The following findings were discussed: 1/16/25: CT Facial Bones: Left nasal septal deviation. Mild to moderate mucosal thickening throughout all paranasal sinuses, worse on the right side.     I have also reviewed prior note from Eliane Antonio CNP dated 1/18/25 and this is contributing to my history and assessment.     Review of Systems  Review of systems is negative for constitutional, ophthalmological, cardiac, pulmonary, renal, gastrointestinal, musculoskeletal, " mental health, endocrine, or neurologic disorders (except as listed in the HPI, PMH, and Problem List).     Objective   Physical Exam  CONSTITUTIONAL: Vital signs reviewed. Patient appears well developed and well nourished.   GENERAL: this is a healthy appearing female who appears stated age. The patient is alert and appropriately verbally conversant without hoarseness. This patient is in no apparent distress.   FACE: The face was inspected and no cutaneous masses or lesions were visualized. There was no erythema or edema noted. Facial movement was symmetric. No skin lesions were detected. There was no sinus tenderness elicited. TMJ crepitus absent.   EYES: Extra-ocular muscle function was intact. No nystagmus was observed. Pupils were equal.   CRANIAL NERVES: Cranial nerves II, III, IV, and VI were noted to be intact via extra-ocular muscle movement testing. Cranial nerve VII noted to be intact and symmetric by facial movement. Cranial nerves IX and X noted to be intact by gag reflex and palatal movement. Cranial nerve XII noted to be intact by active and symmetric tongue movement.   NOSE: Examination of the nose revealed the nasal dorsum to be midline. Intranasal exam reveals the septum is deviated left. The inferior turbinates were healthy. No masses, polyps, mucopus, or other lesion on anterior rhinoscopy. See below procedure note as applicable for further exam.  ORAL CAVITY: Examination of the oral cavity revealed no mass lesions nor infection. The palate was noted to be intact. The tongue exhibited normal mobility. Mucosa was moist without lesion. The lips were free of lesion. Gums were free of inflammation. Dentition: normal without obvious infection or inflammation  OROPHARYNX: The oral pharynx was free of mass lesion or mucosal abnormality. The palate was noted to be without lesion. The uvula was normal appearing. The tonsils were Normal.  EARS: Examination of the ears revealed that the auricles were  normally formed with no lesions. The external auditory canals were normal. The tympanic membranes were intact.  There is no inflammation visualized.   NECK: Visualization and palpation of the neck revealed no mass lesions. No skin lesions or inflammatory processes were detected. The cervical musculature was normal to palpation.   CERVICAL LYMPHATICS: There were no palpable lymph nodes in the posterior triangle, submandibular triangle, jugulodigastric region, or central neck.  RESPIRATORY: Normal inspiration and expiration and chest wall expansion, no use of accessory muscles to breathe, no stridor.  NEUROLOGICAL: Patient is ambulatory without assist. Mentation is clear. Answering questions appropriately.     Nasal / sinus endoscopy    Date/Time: 1/28/2025 1:59 PM    Performed by: ALEXANDRU Lombardo  Authorized by: ALEXANDRU Lombardo    Consent:     Consent obtained:  Verbal    Consent given by:  Patient    Risks discussed:  Bleeding, infection and pain    Alternatives discussed:  No treatment, observation and delayed treatment  Procedure details:     Indications: assessment of airway and sino-nasal symptoms      Medication:  Afrin and Brice-Synephrine 2%    Instrument: flexible fiberoptic nasal endoscope      Scope location: bilateral nare    Post-procedure details:     Patient tolerance of procedure:  Tolerated well, no immediate complications  Comments:      Findings: After topical decongestion with decongestant and anesthetic spray, nasal endoscopy was performed using an endoscope. The septum was deviated left. Several small vessels on the left anterior septum. The inferior turbinates were hypertrophic.  The middle turbinates appeared healthy, the middle meatus is free of purulence, masses, lesions or polyps on the left. On the right, thick mucopurulent drainage from the middle meatus. The superior meatus and sphenoethmoid recess are clear bilaterally. The nasal passageway is patent. The  "nasopharynx was clear. There were no complications and the patient tolerated the procedure well.        Assessment/Plan     ASSESSMENT:   Clifford Mandujano \"Ariel\" is a 24 y.o. year old female with epistaxis localized to several small vessels on the left anterior septum following a nasal injury. Of note, patient with chronic sinusitis and takes flonase daily. She does not desire any surgical intervention for this.    PLAN:   1. Nasal endoscopy: left dev, several small vessels on left anterior septum, mucopurulent drainage from the right middle meatus.   2. I recommended the patient use a humidifier in the bedroom and in the house  3. Patient advised to use mupirocin ointment, at least 2 times per day, by placing on the pad of the thumb and swiping into the nostril, going forward after next follow up.  4. Patient with an acute exacerbation of chronic sinusitis on the right side. Will treat with doxycycline, BID x 10 days. Advised to take with food and discontinue for adverse effects. Discussed that she would also be a candidate for endoscopic sinus surgery, however, she is not interested in considering this currently. Therefore, would not repeat imaging.  5. Discussed epistaxis prevention and acute treatment - Afrin spray PRN for bleeding, apply pressure for 10 minutes if bleeding occurs.  6. Patient advised to follow-up in 4-5 weeks or sooner if needed to assess for benefit of current therapy. Patient agrees with established plan of care and all questions were answered.       "

## 2025-01-28 NOTE — LETTER
"January 28, 2025     JUAN DIEGO Mejía-TERESITA  [address]  Department Of Emergency Medicine  [facility] [address]    Patient: Ariel Mandujano   YOB: 2000   Date of Visit: 1/28/2025       Dear JUAN DIEGO Morel-CNP:    Thank you for referring Ariel Mandujano to me for evaluation. Below are my notes for this consultation.  If you have questions, please do not hesitate to call me. I look forward to following your patient along with you.       Sincerely,     JUAN DIEGO Lombardo-CNP      CC: No Recipients  ______________________________________________________________________________________    Subjective  Patient ID: Clifford Mandujano \"Ariel\" is a 24 y.o. female who presents for Epistaxis (Nose Bleed).  HPI  Clifford Mandujano \"Ariel\"  is a 24 y.o. old female, referred by Eliane Antonio CNP, who presents for evaluation of epistaxis or nosebleeds that started on the left side about 2 weeks ago. Patient notes that her nose has been bleeding consistently since a facial injury 2 weeks ago. She stops the bleeding by inserting a tissue in her nose. Prior intervention for nasal bleeding includes nothing. The patient has history of epistaxis. The last episode began on the left side about a few days ago.  The patient has been to the ER for the nosebleeds. The nose has not been cauterized in the past. Patient reports taking the following anticoagulants/antiplatelet agents: denies.      Patient denies hx of nasal surgery or trauma. No history of uncontrolled hypertension. Pt has no history of significant exposure to toxic fumes, nickel, or wood dust.  Patient denies intranasal steroid use, repetitive digital trauma, or intranasal drug use.     She was using flonase until the injury, has since stopped secondary to the bleeding.     Patient denies any other sinonasal complaints, however, notes a history of allergies. She denies frequent sinus infections. Since the injury, she has had " increased yellow drainage on the right side.     I personally reviewed the patients CT scan images and results. I discussed the results personally with the patient. The following findings were discussed: 1/16/25: CT Facial Bones: Left nasal septal deviation. Mild to moderate mucosal thickening throughout all paranasal sinuses, worse on the right side.     I have also reviewed prior note from Eliane Antonio CNP dated 1/18/25 and this is contributing to my history and assessment.     Review of Systems  Review of systems is negative for constitutional, ophthalmological, cardiac, pulmonary, renal, gastrointestinal, musculoskeletal, mental health, endocrine, or neurologic disorders (except as listed in the HPI, PMH, and Problem List).     Objective  Physical Exam  CONSTITUTIONAL: Vital signs reviewed. Patient appears well developed and well nourished.   GENERAL: this is a healthy appearing female who appears stated age. The patient is alert and appropriately verbally conversant without hoarseness. This patient is in no apparent distress.   FACE: The face was inspected and no cutaneous masses or lesions were visualized. There was no erythema or edema noted. Facial movement was symmetric. No skin lesions were detected. There was no sinus tenderness elicited. TMJ crepitus absent.   EYES: Extra-ocular muscle function was intact. No nystagmus was observed. Pupils were equal.   CRANIAL NERVES: Cranial nerves II, III, IV, and VI were noted to be intact via extra-ocular muscle movement testing. Cranial nerve VII noted to be intact and symmetric by facial movement. Cranial nerves IX and X noted to be intact by gag reflex and palatal movement. Cranial nerve XII noted to be intact by active and symmetric tongue movement.   NOSE: Examination of the nose revealed the nasal dorsum to be midline. Intranasal exam reveals the septum is deviated left. The inferior turbinates were healthy. No masses, polyps, mucopus, or other lesion on  anterior rhinoscopy. See below procedure note as applicable for further exam.  ORAL CAVITY: Examination of the oral cavity revealed no mass lesions nor infection. The palate was noted to be intact. The tongue exhibited normal mobility. Mucosa was moist without lesion. The lips were free of lesion. Gums were free of inflammation. Dentition: normal without obvious infection or inflammation  OROPHARYNX: The oral pharynx was free of mass lesion or mucosal abnormality. The palate was noted to be without lesion. The uvula was normal appearing. The tonsils were Normal.  EARS: Examination of the ears revealed that the auricles were normally formed with no lesions. The external auditory canals were normal. The tympanic membranes were intact.  There is no inflammation visualized.   NECK: Visualization and palpation of the neck revealed no mass lesions. No skin lesions or inflammatory processes were detected. The cervical musculature was normal to palpation.   CERVICAL LYMPHATICS: There were no palpable lymph nodes in the posterior triangle, submandibular triangle, jugulodigastric region, or central neck.  RESPIRATORY: Normal inspiration and expiration and chest wall expansion, no use of accessory muscles to breathe, no stridor.  NEUROLOGICAL: Patient is ambulatory without assist. Mentation is clear. Answering questions appropriately.     Nasal / sinus endoscopy    Date/Time: 1/28/2025 1:59 PM    Performed by: ALEXANDRU Lombardo  Authorized by: ALEXANDRU Lombardo    Consent:     Consent obtained:  Verbal    Consent given by:  Patient    Risks discussed:  Bleeding, infection and pain    Alternatives discussed:  No treatment, observation and delayed treatment  Procedure details:     Indications: assessment of airway and sino-nasal symptoms      Medication:  Afrin and Brice-Synephrine 2%    Instrument: flexible fiberoptic nasal endoscope      Scope location: bilateral nare    Post-procedure details:      "Patient tolerance of procedure:  Tolerated well, no immediate complications  Comments:      Findings: After topical decongestion with decongestant and anesthetic spray, nasal endoscopy was performed using an endoscope. The septum was deviated left. Several small vessels on the left anterior septum. The inferior turbinates were hypertrophic.  The middle turbinates appeared healthy, the middle meatus is free of purulence, masses, lesions or polyps on the left. On the right, thick mucopurulent drainage from the middle meatus. The superior meatus and sphenoethmoid recess are clear bilaterally. The nasal passageway is patent. The nasopharynx was clear. There were no complications and the patient tolerated the procedure well.        Assessment/Plan    ASSESSMENT:   Clifford Mandujano \"Ariel\" is a 24 y.o. year old female with epistaxis localized to several small vessels on the left anterior septum following a nasal injury. Of note, patient with chronic sinusitis and takes flonase daily. She does not desire any surgical intervention for this.    PLAN:   1. Nasal endoscopy: left dev, several small vessels on left anterior septum, mucopurulent drainage from the right middle meatus.   2. I recommended the patient use a humidifier in the bedroom and in the house  3. Patient advised to use mupirocin ointment, at least 2 times per day, by placing on the pad of the thumb and swiping into the nostril, going forward after next follow up.  4. Patient with an acute exacerbation of chronic sinusitis on the right side. Will treat with doxycycline, BID x 10 days. Advised to take with food and discontinue for adverse effects. Discussed that she would also be a candidate for endoscopic sinus surgery, however, she is not interested in considering this currently. Therefore, would not repeat imaging.  5. Discussed epistaxis prevention and acute treatment - Afrin spray PRN for bleeding, apply pressure for 10 minutes if bleeding occurs.  6. " Patient advised to follow-up in 4-5 weeks or sooner if needed to assess for benefit of current therapy. Patient agrees with established plan of care and all questions were answered.

## 2025-01-28 NOTE — PATIENT INSTRUCTIONS
Today you were evaluated by Rebecca Baugh CNP.    Please follow-up in 4 weeks or sooner if needed. If you have any questions or concerns, please contact my office at [phone].     - Begin Mupirocin ointment 3 times daily FOR 2-4 WEEKS as directed. Use the pads of your fingers to apply the ointment and then sniff back gently. Do not use a cue tip or finger nail to place the ointment as this can cause further trauma. IF THE PRESCRIBED OINTMENT IS TOO EXPENSIVE THEN JUST USE OVER THE COUNTER BACITRACIN OR TRIPLE ANTIBIOTIC OINTMENT.     You will be started on Doxycycline, twice daily, for 10 days.    As discussed, use of doxycycline can cause a skin sensitivity reaction with the sun. Avoid sun exposure while taking this medication. Also, do not take this medication with dairy. Take a daily probiotic.     NOSE CARE and NOSEBLEED PREVENTION  - Do not stick anything in your nose other than the medicine as noted below. DO NOT pick your nose as this will cause the bleeding  - Avoid any nose blowing, sneeze with your mouth open, avoid any maneuvers that increase the blood pressure in your head (such as straining on the toilet) and keep your head above your heart as much as possible.  - We recommended the patient use a humidifier in the bedroom and in the house.  - Start using nasal saline gel (Ayr nasal gel) at least 3 times per day. Alternatively, you can also use Vasaline three times daily. You can also use a saline nasal spray (Ocean nasal spray) 4-6 times daily. These are all over the counter medications  - We discussed nosebleed prevention and acute treatment - Afrin or oxymetazoline spray, 2 sprays in each nostril for bleeding, apply pressure for 10 minutes across the soft part of the nose (pinch your nostrils together). If bleeding occurs reapply for another 10 minutes. If this doesn't work then call our office or go to the Emergency Department.

## 2025-02-03 ENCOUNTER — APPOINTMENT (OUTPATIENT)
Dept: INFUSION THERAPY | Facility: CLINIC | Age: 25
End: 2025-02-03
Payer: COMMERCIAL

## 2025-02-04 ENCOUNTER — HOSPITAL ENCOUNTER (OUTPATIENT)
Dept: DIALYSIS | Facility: HOSPITAL | Age: 25
Setting detail: DIALYSIS SERIES
Discharge: HOME | End: 2025-02-04
Payer: COMMERCIAL

## 2025-02-04 ENCOUNTER — PHARMACY VISIT (OUTPATIENT)
Dept: PHARMACY | Facility: CLINIC | Age: 25
End: 2025-02-04
Payer: MEDICAID

## 2025-02-04 VITALS
WEIGHT: 158.73 LBS | DIASTOLIC BLOOD PRESSURE: 80 MMHG | SYSTOLIC BLOOD PRESSURE: 118 MMHG | BODY MASS INDEX: 29.03 KG/M2 | TEMPERATURE: 96.3 F | HEART RATE: 76 BPM

## 2025-02-04 DIAGNOSIS — N80.03 ADENOMYOSIS: Primary | ICD-10-CM

## 2025-02-04 LAB
ERYTHROCYTE [DISTWIDTH] IN BLOOD BY AUTOMATED COUNT: 22.8 % (ref 11.5–14.5)
HCT VFR BLD AUTO: 36.9 % (ref 36–46)
HGB BLD-MCNC: 11.2 G/DL (ref 12–16)
MCH RBC QN AUTO: 25.1 PG (ref 26–34)
MCHC RBC AUTO-ENTMCNC: 30.4 G/DL (ref 32–36)
MCV RBC AUTO: 83 FL (ref 80–100)
NRBC BLD-RTO: 0 /100 WBCS (ref 0–0)
PLATELET # BLD AUTO: 354 X10*3/UL (ref 150–450)
RBC # BLD AUTO: 4.47 X10*6/UL (ref 4–5.2)
WBC # BLD AUTO: 4.7 X10*3/UL (ref 4.4–11.3)

## 2025-02-04 PROCEDURE — 36415 COLL VENOUS BLD VENIPUNCTURE: CPT | Performed by: STUDENT IN AN ORGANIZED HEALTH CARE EDUCATION/TRAINING PROGRAM

## 2025-02-04 PROCEDURE — 96365 THER/PROPH/DIAG IV INF INIT: CPT

## 2025-02-04 PROCEDURE — 2500000004 HC RX 250 GENERAL PHARMACY W/ HCPCS (ALT 636 FOR OP/ED): Mod: SE | Performed by: STUDENT IN AN ORGANIZED HEALTH CARE EDUCATION/TRAINING PROGRAM

## 2025-02-04 PROCEDURE — 36415 COLL VENOUS BLD VENIPUNCTURE: CPT

## 2025-02-04 PROCEDURE — 96366 THER/PROPH/DIAG IV INF ADDON: CPT

## 2025-02-04 PROCEDURE — 85027 COMPLETE CBC AUTOMATED: CPT | Performed by: STUDENT IN AN ORGANIZED HEALTH CARE EDUCATION/TRAINING PROGRAM

## 2025-02-04 RX ORDER — FAMOTIDINE 10 MG/ML
20 INJECTION INTRAVENOUS ONCE AS NEEDED
Status: DISCONTINUED | OUTPATIENT
Start: 2025-02-04 | End: 2025-02-05 | Stop reason: HOSPADM

## 2025-02-04 RX ORDER — FAMOTIDINE 10 MG/ML
20 INJECTION INTRAVENOUS ONCE AS NEEDED
OUTPATIENT
Start: 2025-02-05

## 2025-02-04 RX ORDER — ALBUTEROL SULFATE 0.83 MG/ML
3 SOLUTION RESPIRATORY (INHALATION) AS NEEDED
OUTPATIENT
Start: 2025-02-05

## 2025-02-04 RX ORDER — EPINEPHRINE 1 MG/ML
0.3 INJECTION, SOLUTION, CONCENTRATE INTRAVENOUS EVERY 5 MIN PRN
Status: DISCONTINUED | OUTPATIENT
Start: 2025-02-04 | End: 2025-02-05 | Stop reason: HOSPADM

## 2025-02-04 RX ORDER — DIPHENHYDRAMINE HYDROCHLORIDE 50 MG/ML
50 INJECTION INTRAMUSCULAR; INTRAVENOUS AS NEEDED
OUTPATIENT
Start: 2025-02-05

## 2025-02-04 RX ORDER — DIPHENHYDRAMINE HYDROCHLORIDE 50 MG/ML
50 INJECTION INTRAMUSCULAR; INTRAVENOUS AS NEEDED
Status: DISCONTINUED | OUTPATIENT
Start: 2025-02-04 | End: 2025-02-05 | Stop reason: HOSPADM

## 2025-02-04 RX ORDER — ALBUTEROL SULFATE 0.83 MG/ML
3 SOLUTION RESPIRATORY (INHALATION) AS NEEDED
Status: DISCONTINUED | OUTPATIENT
Start: 2025-02-04 | End: 2025-02-05 | Stop reason: HOSPADM

## 2025-02-04 RX ORDER — EPINEPHRINE 1 MG/ML
0.3 INJECTION, SOLUTION, CONCENTRATE INTRAVENOUS EVERY 5 MIN PRN
OUTPATIENT
Start: 2025-02-05

## 2025-02-04 RX ADMIN — SODIUM CHLORIDE 300 MG: 9 INJECTION, SOLUTION INTRAVENOUS at 09:57

## 2025-02-04 NOTE — PROGRESS NOTES
[facility]   Infusion Clinic Note   Date: 2025   Name: Clifford Mandujano  : 2000   MRN: [id number]         Reason for Visit:   OP Infusion (Venofer 300mg)      Accompanied by: Relative   Visit Type: Infusion   Diagnosis: Adenomyosis    Allergies:   Allergies as of 2025 - Reviewed 2025   Allergen Reaction Noted    Cat dander Unknown 10/10/2024    Dog dander Unknown 10/10/2024    Grass pollen Unknown 2024    Histamine Unknown 2024    Histamine phosphate Unknown 10/10/2024    Ragweed Unknown 10/10/2024    Weed pollen-short ragweed Unknown 2024      Current Meds:  Current Outpatient Medications   Medication Instructions    albuterol (Ventolin HFA) 90 mcg/actuation inhaler 2 puffs, inhalation, Every 4 hours PRN    cetirizine (ZYRTEC) 10 mg, oral, Daily    docusate sodium (COLACE) 100 mg, oral, 2 times daily PRN    doxycycline (ADOXA) 100 mg, oral, 2 times daily, Take with food twice daily. Take the full course of the medication. Be sure to wear a SPF or keep covered in the sun.    fluticasone (Flonase) 50 mcg/actuation nasal spray 1 spray, Each Nostril, Daily, Shake gently. Before first use, prime pump. After use, clean tip and replace cap.    mupirocin (Bactroban) 2 % ointment Apply a pea sized amount to the pad of the thumb, swipe into the nostril, sniff, then pinch the nose 2-3 times daily. Do NOT use a Q-tip.    naproxen (NAPROSYN) 500 mg, oral, 2 times daily    norethindrone (AYGESTIN) 5 mg, oral, Daily    oxymetazoline (Afrin) 0.05 % nasal spray 2 sprays, Each Nostril, Every 12 hours PRN, Do not use for more than 3 days.           Vitals:  Vitals:    25 0923 25 1144   BP: (!) 129/92 118/80   BP Location: Left arm    Patient Position: Sitting    BP Cuff Size: Adult    Pulse: 98 76   Temp: 35.1 °C (95.2 °F) 35.7 °C (96.3 °F)   TempSrc: Tympanic    Weight: 72 kg (158 lb 11.7 oz)       Infusion Pre-procedure Checklist   Allergies  reviewed: yes   Medications reviewed: yes   Contraindications to treatment:No   Previous reaction to current treatment:No   Current Health Issues: Pt is currently taking Doxycycline for a sinus infection. PCP informed and approved patient for receiving iron today.   Has an active or recent infection with/without current antibiotic use Yes   Has recent/planned dental work No   Has recent/planned surgeries Yes   Has recently received or plans to receive vaccinations No     Is pregnant (unless noted otherwise) No   Pain: '    Is the pain different from normal: No   Is the pain tolerable: n/a   Is your Doctor aware: n/a   Contraindications based on patient history: No   Provider notified: PCP aware of planned surgery and sinus infection with Abx prescription.   Labs: None      Review of Systems - Oncology   Negative for complaint: [x] all other systems have been reviewed and are negative for complaint     Pt received Venofer infusion without complication. IV d/c'd, tip intact. Pt left clinic accompanied by family member.    Initiated By: Nicole Nicole RN   Time: 11:50 AM

## 2025-02-10 ENCOUNTER — APPOINTMENT (OUTPATIENT)
Dept: PRIMARY CARE | Facility: CLINIC | Age: 25
End: 2025-02-10
Payer: COMMERCIAL

## 2025-02-10 DIAGNOSIS — F07.81 POST CONCUSSION SYNDROME: Primary | ICD-10-CM

## 2025-02-10 PROCEDURE — 99214 OFFICE O/P EST MOD 30 MIN: CPT | Performed by: FAMILY MEDICINE

## 2025-02-10 NOTE — LETTER
February 10, 2025     Patient: Clifford Mandujano   YOB: 2000   Date of Visit: 2/10/2025       To Whom It May Concern:    Clifford Mandujano was seen in my clinic on 2/10/2025 at 2:30 pm. Please excuse Clifford for her absence from work on this day to make the appointment.    If you have any questions or concerns, please don't hesitate to call.         Sincerely,         Donnie Lombardi,         CC: No Recipients

## 2025-02-10 NOTE — PROGRESS NOTES
Concussion     Clifford Mandujano is a 24 year old female presenting to concussion clinic for reevaluation.       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 When she turned around, a door hit her in the face. Pt closed the door and she continued with work. Her head was hurting at the time, her nose was bleeding later in the day.     Jan 16, 2025- she was dizzy and nauseous and her head was hurting. She went to the ER. Scans were done. Discharged home.    Jan 18, 2025- her nose was still bleeding so she went back to the ER.         Last visit: Jan 23, 2025-   - 50% back to normal from a concussion standpoint.       Headache- just feels like she has a constant headache. Feb 10, 2025 update: not as constant, usually starts in the evening.   Pressure in head- improving, but still feels constantly. Feb 10, 2025 update: none for the past week.    Sensitivity to light- mostly with bright lights. Feb 10, 2025 update: 50% better.    Feeling slowed down- like she can't do a much as she usually does. Feb 10, 2025 update: improved.   Don't feel right- she feels off. Feb 10, 2025 update: feels fine.    Difficulty concentrating- she makes people repeat things. Feb 10, 2025 update: still having, but not as bad.   Difficulty remembering- sometimes at work, and with conversations. Feb 10, 2025 update: things seem out of order or scrambled to her.    Confusion- sometimes at work. Feb 10, 2025 update: still having.   Trouble falling asleep- since the injury- takes 'forever' for her to get to sleep. Feb 10, 2025 update: she is tired, but she can't go to sleep easily.         Work: [employer]. Feb 10, 2025 update: took a week off of work and felt better, went back to work Feb 10. She is planning to see how this week goes and potentially decrease. She is planning on starting a new job .   School: none  Sports: usually likes to go to the gym. Went to [facility]. Went to [place]  Preventive Care Visit  [facility]  Dio Smith Clinton presents for 8 year [facility] today. His mother states he has been healthy recently. Medications include only albuterol inhaler as needed. He is 4th grade and attends [employer] in [place]. [place].   Paperwork: no injury report filled out at work. No EMS. Not [facility]. No attorneys.       Healthcare team:  - ENT appt Jan 28, 2025- was told she has chronic sinusitis.   - PCP        Date of current head injury:   - Abhijeet 15, 2025  Previous concussion history:  - none  Imaging for a head injury/concussion:  - CT head Jan 16, 2025- no acute findings  Depression, anxiety, psychiatric disorder, learning disability, dyslexia, ADD/ADHD?:  - depression/anxiety. Previously had a therapist, she may be in the process to determine if she has autism. This is being directed by her PCP.   Headache history:   - consistent headaches in her youth.       Concussion symptoms: severity 0 to 6    Headache 1  Pressure in head 0  Neck pain 0  Nausea or vomiting 0  Dizziness 0  Blurred vision 0  Balance problems 0  Sensitivity to light 1  Sensitivity to noise 2  Feeling slowed down 0  Feeling like in a fog 1  Don't feel right 0  Difficulty concentrating 2  Difficulty remembering 0  Fatigue or low energy 0  Confusion 1  Drowsiness 0  More emotional 2  Irritability 0  Sadness 2  Nervous or Anxious 0  Trouble falling asleep 4     There were no vitals taken for this visit.     Gen: NAD, Alert and oriented x3  Head: min right scalp ttp. no step offs  Face: no specific areas of ttp; no step offs  Neck: no soft tissue ttp.   Psych: mood pleasant and appropriate  Resp: good respiratory effort  Neurologic: CN II-XII grossly intact. Strength and sensation symmetric and intact throughout all 4 extremities. DTR 2+ in all 4 extremities. Cerebellar testing normal. Negative Rhomberg's test. No dysdiadochokinesis.  Gait: normal                CT head wo IV contrast, CT maxillofacial bones wo IV contrast  Narrative: Interpreted By:  Daniel Ambrosio,   STUDY:  CT HEAD WO IV CONTRAST; CT FACIAL BONES WO IV CONTRAST;  1/16/2025  8:16 pm      INDICATION:  Signs/Symptoms:head injury; Signs/Symptoms:head injury, she feels  that her teeth aren't  "  3rd Grade   Current school Medical Center of Southern Indiana aligned. Altered mental status      COMPARISON:  None.      ACCESSION NUMBER(S):  [id number]; [id number]      ORDERING CLINICIAN:  ROSENDO FLEMING      TECHNIQUE:  Axial noncontrast CT images of the head and facial bones. 3D volume  rendering of the facial bones was obtained on a separate workstation.      FINDINGS:  BRAIN PARENCHYMA: Gray-white matter interfaces are preserved. No  mass, mass effect or midline shift. No focal brain parenchymal  abnormality.      HEMORRHAGE: No acute intracranial hemorrhage.  VENTRICLES and EXTRA-AXIAL SPACES: Normal size for age.  EXTRACRANIAL SOFT TISSUES: Unremarkable.  CALVARIUM: No depressed calvarial fracture.      FACIAL BONES: No acute facial bone fracture.  SOFT TISSUES: Within normal limits.  PARANASAL SINUSES: No hemorrhage in the paranasal sinuses. Moderate  polypoid mucosal thickening of the right greater than left frontal  sinuses, right ethmoid air cells, and right frontal sinus. Mild  mucosal thickening of the left maxillary sinus and of the bilateral  sphenoid sinuses. Foot is. MASTOIDS: Within normal limits.  ORBITS: The globes, extraocular muscles and optic nerve sheath  complexes are symmetric. No retrobulbar hematoma.              OTHER FINDINGS: None.          Impression: No acute intracranial abnormality.      No evidence of facial bone fracture.  Moderate right greater than left polypoid mucosal thickening of the  paranasal sinuses. Clinical correlation for sinusitis recommended.      Signed by: Daniel Ambrosio 1/16/2025 8:35 PM  Dictation workstation:   [id number]      No MRI head results found for the past 12 months     CT head wo IV contrast    Result Date: 1/16/2025  Interpreted By:  Daniel Ambrosio, STUDY: CT HEAD WO IV CONTRAST; CT FACIAL BONES WO IV CONTRAST;  1/16/2025 8:16 pm   INDICATION: Signs/Symptoms:head injury; Signs/Symptoms:head injury, she feels that her teeth aren't aligned. Altered mental status   COMPARISON: None.   ACCESSION NUMBER(S):  [id number]; [id number]   ORDERING CLINICIAN: ROSENDO FLEMING   TECHNIQUE: Axial noncontrast CT images of the head and facial bones. 3D volume rendering of the facial bones was obtained on a separate workstation.   FINDINGS: BRAIN PARENCHYMA: Gray-white matter interfaces are preserved. No mass, mass effect or midline shift. No focal brain parenchymal abnormality.   HEMORRHAGE: No acute intracranial hemorrhage. VENTRICLES and EXTRA-AXIAL SPACES: Normal size for age. EXTRACRANIAL SOFT TISSUES: Unremarkable. CALVARIUM: No depressed calvarial fracture.   FACIAL BONES: No acute facial bone fracture. SOFT TISSUES: Within normal limits. PARANASAL SINUSES: No hemorrhage in the paranasal sinuses. Moderate polypoid mucosal thickening of the right greater than left frontal sinuses, right ethmoid air cells, and right frontal sinus. Mild mucosal thickening of the left maxillary sinus and of the bilateral sphenoid sinuses. Foot is. MASTOIDS: Within normal limits. ORBITS: The globes, extraocular muscles and optic nerve sheath complexes are symmetric. No retrobulbar hematoma.       OTHER FINDINGS: None.         No acute intracranial abnormality.   No evidence of facial bone fracture. Moderate right greater than left polypoid mucosal thickening of the paranasal sinuses. Clinical correlation for sinusitis recommended.   Signed by: Daniel mAbrosio 1/16/2025 8:35 PM Dictation workstation:   [id number]        1. Post concussion syndrome             Your concussion symptoms are improving and nearly resolved.     Your post concussion syndrome is mild.     Your recovery may be slowed by the risk factors we discussed.    I recommend limiting screen time. No more than 2 hours/day of total screen time is a reasonable amount.      Tylenol or ibuprofen are ok for headaches.       You should avoid activities that place you at risk for sustaining another head injury.    Will print of work note saying you were here today    Work modifications  "include: you may continue to work as tolerated, continuing to work with concussion symptoms may slow your recovery.     I can complete [facility] paperwork and fax it to the number you provide to the office.     I recommend establishing with a provider that specializes in [facility] cases. I will happily stay on as the concussion specialist and make my recommendations during your recovery. Before your first [facility] visit- most offices will require you to have the \"First Report of Injury\" completed by an ER or urgent care facility. Consider:    Dr. Chiki Yeager  [facility]  [address] [place] [address].  [place] [address]  Patients can call the central scheduling number at [phone] and request to see Dr. Chiki Yeager.       Dr. Sergey Busby  Location: [facility] (Monday, Thursday)  [facility]  [address] [address], [address]  Patients can call the central scheduling number at [phone] and request to see Dr. Sergey Busby.       Follow up with Dr. Lombardi: 2-3 weeks.     If you are not improving by the time you follow up- we will consider a referral to physical therapy.    If physical therapy and rest do not help you fully recover, another step in the management of your head injury may be a referral to neuropsychology.       " Dio Smith MD  [facility]

## 2025-02-11 ENCOUNTER — TREATMENT (OUTPATIENT)
Dept: PHYSICAL THERAPY | Facility: CLINIC | Age: 25
End: 2025-02-11
Payer: COMMERCIAL

## 2025-02-11 DIAGNOSIS — R10.2 PELVIC PAIN IN FEMALE: ICD-10-CM

## 2025-02-11 PROCEDURE — 97110 THERAPEUTIC EXERCISES: CPT | Mod: GP | Performed by: PHYSICAL THERAPIST

## 2025-02-11 NOTE — PROGRESS NOTES
"Physical Therapy    PELVIC FLOOR TREATMENT    Name: Clifofrd Mandujano \"Ariel\"  MRN: [id number]  : 2000  Today's Date: 25     Time Calculation  Start Time: 810  Stop Time: 0855  Time Calculation (min): 45 min       PT Therapeutic Procedures Time Entry  Therapeutic Exercise Time Entry: 40       Visit: 3  Insurance: [facility]  Auth: No   30 visits per year     Assessment:   Some tension/stiffness noted with cobra   No increase in pain during session  Will continue to progress with flexibility work     Plan:   Half kneeling flexibility; hip flexor   Lateral lunge       Current Problem:  Problem List Items Addressed This Visit             ICD-10-CM    Pelvic pain in female R10.2       Subjective   Pain has been worse over the last week or so   Frequency and urgency has increased a bit as well        Objective   Mod limitation IR R AROM vs L       TREATMENT  Therapeutic exercise:  Butterfly stretch x 1 min   Happy baby stretch x 1 min   Figure 4 stretch x 1 min ea   Lumbar rot x 10   Reverse clamshells x 10 ea   Cobra into child's pose x 5 with 3 second hold   Cat cow x 10   Squat with pelvic floor relaxation x 10   Half kneeling hip add  x 10 with 5 hold         Careplan Goals:  Problem: PT Problem       Goal: Pt will be independent in urgency control techniques for reduced nocturia to 1x/night for improved sleep quality           Goal: Pt will report reduced pelvic pain by 25% for improved ability to perform daily activities           Goal: Pt will increase pelvic floor flexibility demonstrated by ability to utilize tampon without pain           Goal: Pt will be independent in HEP for home maintenance            Layton Davis, PT  "

## 2025-02-24 ENCOUNTER — PATIENT MESSAGE (OUTPATIENT)
Dept: OBSTETRICS AND GYNECOLOGY | Facility: CLINIC | Age: 25
End: 2025-02-24
Payer: COMMERCIAL

## 2025-02-24 DIAGNOSIS — N94.6 DYSMENORRHEA: ICD-10-CM

## 2025-02-24 DIAGNOSIS — N92.1 MENORRHAGIA WITH IRREGULAR CYCLE: Primary | ICD-10-CM

## 2025-02-26 ENCOUNTER — TREATMENT (OUTPATIENT)
Dept: PHYSICAL THERAPY | Facility: CLINIC | Age: 25
End: 2025-02-26
Payer: COMMERCIAL

## 2025-02-26 ENCOUNTER — TELEPHONE (OUTPATIENT)
Dept: OBSTETRICS AND GYNECOLOGY | Facility: CLINIC | Age: 25
End: 2025-02-26
Payer: COMMERCIAL

## 2025-02-26 DIAGNOSIS — R10.2 PELVIC PAIN IN FEMALE: ICD-10-CM

## 2025-02-26 PROCEDURE — 97110 THERAPEUTIC EXERCISES: CPT | Mod: GP | Performed by: PHYSICAL THERAPIST

## 2025-02-26 RX ORDER — NORETHINDRONE 5 MG/1
10 TABLET ORAL DAILY
Qty: 90 TABLET | Refills: 3 | Status: SHIPPED | OUTPATIENT
Start: 2025-02-26 | End: 2026-02-26

## 2025-02-26 NOTE — PROGRESS NOTES
"Physical Therapy    PELVIC FLOOR TREATMENT    Name: Clifford Mandujano \"Ariel\"  MRN: [id number]  : 2000  Today's Date: 25     Time Calculation  Start Time: 0900  Stop Time: 0945  Time Calculation (min): 45 min       PT Therapeutic Procedures Time Entry  Therapeutic Exercise Time Entry:        Visit: 4  Insurance: [facility]  Auth: No   30 visits per year     Assessment:   Focused on gentle flexibility and PF relaxation today; pt tolerated well   MHP seemed to be helpful in minimizing lower abdominal symptoms     Plan:   Review post op exercise  Return to PT following post-op    Current Problem:  Problem List Items Addressed This Visit             ICD-10-CM    Pelvic pain in female R10.2         Subjective   Pt reports she is on her period again; her last was about 2 weeks ago  Still having cramping into lower abdomen as well as inner groin   Yesterday, she was unable to go back to work d/t pain      Objective   Mod limitation IR R AROM vs L       TREATMENT  Therapeutic exercise:  Butterfly stretch x 1 min   Figure 4 stretch x 1 min ea   Lumbar rot x 10   Happy baby stretch x 1 min   Reverse clamshells x 10 ea     With MHP to lower abdomen with exercise     Not performed today:  Cobra into child's pose x 5 with 3 second hold   Cat cow x 10   Squat with pelvic floor relaxation x 10   Half kneeling hip add  x 10 with 5 hold         Careplan Goals:  Problem: PT Problem       Goal: Pt will be independent in urgency control techniques for reduced nocturia to 1x/night for improved sleep quality           Goal: Pt will report reduced pelvic pain by 25% for improved ability to perform daily activities           Goal: Pt will increase pelvic floor flexibility demonstrated by ability to utilize tampon without pain           Goal: Pt will be independent in HEP for home maintenance            Layton Davis, PT  "

## 2025-02-26 NOTE — TELEPHONE ENCOUNTER
Pt had sent message that her bleeding is increased. Pt states she is changing pads q 1-2 hrs and passing clots. Sent message to Dr Sanchez who is increasing her norethindrone, ordered a cbc. Warning precautions given as to when she should go to ER, Pt denied dizziness or being light headed. Pt receptive to instructions

## 2025-02-28 LAB
ERYTHROCYTE [DISTWIDTH] IN BLOOD BY AUTOMATED COUNT: 20.6 % (ref 11–15)
HCT VFR BLD AUTO: 37.5 % (ref 35–45)
HGB BLD-MCNC: 11.9 G/DL (ref 11.7–15.5)
MCH RBC QN AUTO: 26.9 PG (ref 27–33)
MCHC RBC AUTO-ENTMCNC: 31.7 G/DL (ref 32–36)
MCV RBC AUTO: 84.7 FL (ref 80–100)
PLATELET # BLD AUTO: 318 THOUSAND/UL (ref 140–400)
PMV BLD REES-ECKER: 11 FL (ref 7.5–12.5)
RBC # BLD AUTO: 4.43 MILLION/UL (ref 3.8–5.1)
WBC # BLD AUTO: 4.5 THOUSAND/UL (ref 3.8–10.8)

## 2025-03-05 ENCOUNTER — APPOINTMENT (OUTPATIENT)
Dept: PRIMARY CARE | Facility: CLINIC | Age: 25
End: 2025-03-05
Payer: COMMERCIAL

## 2025-03-07 ENCOUNTER — CLINICAL SUPPORT (OUTPATIENT)
Dept: PREADMISSION TESTING | Facility: HOSPITAL | Age: 25
End: 2025-03-07
Payer: COMMERCIAL

## 2025-03-07 RX ORDER — KETOROLAC TROMETHAMINE 10 MG/1
TABLET, FILM COATED ORAL EVERY 6 HOURS PRN
COMMUNITY

## 2025-03-07 ASSESSMENT — ENCOUNTER SYMPTOMS
EYES NEGATIVE: 1
CONSTITUTIONAL NEGATIVE: 1
CARDIOVASCULAR NEGATIVE: 1
RESPIRATORY NEGATIVE: 1
BRUISES/BLEEDS EASILY: 1
NECK NEGATIVE: 1
ENDOCRINE NEGATIVE: 1
NAUSEA: 1

## 2025-03-07 ASSESSMENT — DUKE ACTIVITY SCORE INDEX (DASI)
CAN YOU HAVE SEXUAL RELATIONS: YES
CAN YOU PARTICIPATE IN MODERATE RECREATIONAL ACTIVITIES LIKE GOLF, BOWLING, DANCING, DOUBLES TENNIS OR THROWING A BASEBALL OR FOOTBALL: YES
CAN YOU DO MODERATE WORK AROUND THE HOUSE LIKE VACUUMING, SWEEPING FLOORS OR CARRYING GROCERIES: YES
CAN YOU CLIMB A FLIGHT OF STAIRS OR WALK UP A HILL: YES
CAN YOU WALK INDOORS, SUCH AS AROUND YOUR HOUSE: YES
CAN YOU PARTICIPATE IN STRENOUS SPORTS LIKE SWIMMING, SINGLES TENNIS, FOOTBALL, BASKETBALL, OR SKIING: NO
CAN YOU DO LIGHT WORK AROUND THE HOUSE LIKE DUSTING OR WASHING DISHES: YES
CAN YOU RUN A SHORT DISTANCE: YES
TOTAL_SCORE: 58.2
DASI METS SCORE: 9.9
CAN YOU PARTICIPATE IN STRENOUS SPORTS LIKE SWIMMING, SINGLES TENNIS, FOOTBALL, BASKETBALL, OR SKIING: YES
CAN YOU DO HEAVY WORK AROUND THE HOUSE LIKE SCRUBBING FLOORS OR LIFTING AND MOVING HEAVY FURNITURE: YES
CAN YOU WALK A BLOCK OR TWO ON LEVEL GROUND: YES
CAN YOU TAKE CARE OF YOURSELF (EAT, DRESS, BATHE, OR USE TOILET): YES
CAN YOU DO YARD WORK LIKE RAKING LEAVES, WEEDING OR PUSHING A MOWER: YES

## 2025-03-07 NOTE — CPM/PAT H&P
"CPM/PAT Evaluation       Name: Clifford Mandujano (Clifford Mandujano \"Ariel\")  /Age: [age]/ y.o.     { PAT Visit Type:27048}      Chief Complaint: ***    HPI  Clifford Mandujano is scheduled for EXCISION, MASS, UTERUS, ABDOMINAL APPROACH with Dr. Sanchez on 3/25/25.  Past Medical History:   Diagnosis Date    Anemia     Iron Deficiency Anemia; venofer infusions.    Chlamydial infection, unspecified 2020    Chlamydia    Dysmenorrhea, unspecified 10/11/2018    Adolescent dysmenorrhea    Encounter for other specified special examinations     Diagnostic skin test    Encounter for routine child health examination with abnormal findings 2018    Encounter for routine child health examination with abnormal findings    Fibroids     Hypermetropia, right eye 2019    Hypermetropia of right eye    Other conditions influencing health status     Full-term infant    Other diseases of vocal cords     Vocal cord dysfunction    Pelvic pain     Personal history of diseases of the skin and subcutaneous tissue 2018    History of acne    Personal history of other diseases of the nervous system and sense organs     History of migraine    Personal history of other endocrine, nutritional and metabolic disease 2015    History of vitamin D deficiency    Personal history of other infectious and parasitic diseases 2018    History of candidiasis of vagina    Personal history of other specified conditions 2020    History of epistaxis    Personal history of other specified conditions 2016    History of headache    Unspecified asthma with (acute) exacerbation ([facility]) 2014    Asthma exacerbation       No past surgical history on file.    Patient  reports that she is not currently sexually active.    Family History   Problem Relation Name Age of Onset    Fibroids Mother          had genetic testing for Cordero syndrome which was negative    Endometrial cancer Maternal Grandmother      Stroke " Maternal Grandmother      Ovarian cancer Neg Hx      Colon cancer Neg Hx         Allergies   Allergen Reactions    Cat Dander Unknown    Dog Dander Unknown    Grass Pollen Unknown    Histamine Unknown    Histamine Phosphate Unknown    Ragweed Unknown    Weed Pollen-Short Ragweed Unknown       Prior to Admission medications    Medication Sig Start Date End Date Taking? Authorizing Provider   albuterol (Ventolin HFA) 90 mcg/actuation inhaler Inhale 2 puffs every 4 hours if needed for wheezing or shortness of breath. 10/22/24 10/22/25  Iftikhar Zamora DO   cetirizine (ZyrTEC) 10 mg tablet Take 1 tablet (10 mg) by mouth once daily. 10/22/24 2/13/25  Iftikhar Zamora DO   docusate sodium (Colace) 100 mg capsule Take 1 capsule (100 mg) by mouth 2 times a day as needed for constipation. 11/25/24   Ivy Sanchez MD   fluticasone (Flonase) 50 mcg/actuation nasal spray Administer 1 spray into each nostril once daily. Shake gently. Before first use, prime pump. After use, clean tip and replace cap. 10/22/24 10/22/25  Iftikhar Zamora DO   mupirocin (Bactroban) 2 % ointment Apply a pea sized amount to the pad of the thumb, swipe into the nostril, sniff, then pinch the nose 2-3 times daily. Do NOT use a Q-tip. 1/28/25 5/8/25  ALEXANDRU Lombardo   naproxen (Naprosyn) 500 mg tablet Take 1 tablet (500 mg) by mouth 2 times a day.  Patient not taking: Reported on 1/28/2025 11/19/24   Isiah Mitchell PA-C   norethindrone (Aygestin) 5 mg tablet Take 2 tablets (10 mg) by mouth once daily. 2/26/25 2/26/26  Ivy Sanchez MD   oxymetazoline (Afrin) 0.05 % nasal spray Administer 2 sprays into each nostril every 12 hours if needed for congestion for up to 2 days. Do not use for more than 3 days. 1/18/25 6/17/25  ALEXANDRU Mejía        PAT ROS:   Constitutional:   neg    Neuro/Psych:   Eyes:   neg    Ears:   neg    Nose:   neg    Mouth:   neg    Throat:   neg    Neck:   neg    Cardio:   neg    Respiratory:    neg    Endocrine:   neg    GI:    nausea  :   neg    Musculoskeletal:   Hematologic:    bruises/bleeds easily  Skin:      PAT Physical Exam     Airway    Testing/Diagnostic:   CT head, CT FACIAL BONES WO IV CONTRAST; 1/16/25   IMPRESSION:   No acute intracranial abnormality.   No evidence of facial bone fracture.   Moderate right greater than left polypoid mucosal thickening of the   paranasal sinuses. Clinical correlation for sinusitis recommended.     MRI pelvis; 12/13/24  IMPRESSION:  1.  Enlarged multilobulated leiomyomatous uterus with some  demonstrating hemorrhagic degeneration. No evidence of endometriosis.      CT abd/pelvis; 11/19/24   IMPRESSION:  1. Heterogeneous and enlarged uterus with multiple soft tissue masses  compatible with leiomyomas, there is a cystic mass of the inferior  uterus which may represent a subacute degenerating leiomyoma, further  evaluation with pelvic ultrasound is recommended. No etiology for  lower abdominal tendinous is otherwise evident.  2. Trace amount of pelvic free fluid, likely physiologic in etiology.    US pelvis; 11/19/24   IMPRESSION:  1. Uterine fibroids, without significant interval change when  compared to prior.  2. Unremarkable sonographic appearance of the ovaries.    Patient Specialist/PCP:   PCP: Donnie Lombardi DO 2/10/25 presenting to concussion clinic for reevaluation. States that she is unsure is this is her PCP.     Otolaryngology: JUAN DIEGO Lombardo-CNP 1/28/25 presents for Epistaxis (Nose Bleed).     Non-Onc Infusion: JUAN DIEGO Leal-CNP 1/20/25 Patient to be scheduled for venofer infusions. For Diagnosis: Iron Deficiency Anemia     Gynecology: Ivy Sanchez MD 1/6/25 presents for Epistaxis (Nose Bleed). Pt reports irregular menses since menarche     OBGYN: Shalonda Sequeira MD 12/6/24 Presents today with Concern about Yeast infections.     ED visit: 1/16/25, concussion & 1/18/25, continued headache and nose bleeds      Pulmonology: Chloe Burton MD 8/6/21 20 year old with mild persistent asthma based on risk (systemic steroids x 2 in the last year). Impairment fairly well controlled with no use of inhalers since March. Also with allergic rhinitis that is not well controlled.     Visit Vitals  OB Status Having periods   Smoking Status Never       DASI Risk Score    No data to display       Caprini DVT Assessment    No data to display       Modified Frailty Index    No data to display       [id number] Stroke Risk Points  Current as of just now        N/A 0 to 9 Points:      Last Change: N/A          The [id number] risk score (Lip ANGELICA, et al. 2009. © 2010 American College of Chest [employer]icians) quantifies the risk of stroke for a patient with atrial fibrillation. For patients without atrial fibrillation or under the age of 18 this score appears as N/A. Higher score values generally indicate higher risk of stroke.        This score is not applicable to this patient. Components are not calculated.          Revised Cardiac Risk Index    No data to display       Apfel Simplified Score    No data to display       Risk Analysis Index Results This Encounter    No data found in the last 10 encounters.       Prodigy: High Risk  Total Score: 0          ARISCAT Score for Postoperative Pulmonary Complications    No data to display       Marrero Perioperative Risk for Myocardial Infarction or Cardiac Arrest (JOSE)    No data to display         Assessment and Plan:   Batool Harrison RN  Pre Admission Testing   {[facility] EMBEDDED ASSESSMENT AND PLAN:35120}

## 2025-03-10 ENCOUNTER — TREATMENT (OUTPATIENT)
Dept: PHYSICAL THERAPY | Facility: CLINIC | Age: 25
End: 2025-03-10
Payer: COMMERCIAL

## 2025-03-10 DIAGNOSIS — R10.2 PELVIC PAIN IN FEMALE: ICD-10-CM

## 2025-03-10 PROCEDURE — 97110 THERAPEUTIC EXERCISES: CPT | Mod: GP | Performed by: PHYSICAL THERAPIST

## 2025-03-10 NOTE — PROGRESS NOTES
"Physical Therapy    PELVIC FLOOR TREATMENT    Name: Clifford Mandujano \"Ariel\"  MRN: [id number]  : 2000  Today's Date: 03/10/25     Time Calculation  Start Time: 1045  Stop Time: 1115  Time Calculation (min): 30 min       PT Therapeutic Procedures Time Entry  Therapeutic Exercise Time Entry:        Visit: 5  Insurance: [facility]  Auth: No   30 visits per year     Assessment:   Focused on all flexibility work pt can continue through post op   Will begin HEP about 2 weeks post op and will follow up with therapy after post op MD appointment     Plan:   Email to follow up to schedule for post op     Current Problem:  Problem List Items Addressed This Visit             ICD-10-CM    Pelvic pain in female R10.2         Subjective   Pt reports new symptom of bloating and pain around 2-3 am in the mornings daily  Subsides and then pt feels good for the rest of the day   Has increased BC to 10 mg per MD vs 5 mg       Objective   Mod limitation IR R AROM vs L       TREATMENT  Therapeutic exercise:  Butterfly stretch x 1 min   Figure 4 stretch x 1 min ea   Happy baby stretch x 1 min   Lumbar rot x 10   Reverse clamshells x 10 ea   Cobra into child's pose x 5 with 3 second hold   Cat cow x 10   Squat with pelvic floor relaxation x 10       Careplan Goals:  Problem: PT Problem       Goal: Pt will be independent in urgency control techniques for reduced nocturia to 1x/night for improved sleep quality           Goal: Pt will report reduced pelvic pain by 25% for improved ability to perform daily activities           Goal: Pt will increase pelvic floor flexibility demonstrated by ability to utilize tampon without pain           Goal: Pt will be independent in HEP for home maintenance            Layton Davis, PT  "

## 2025-03-13 ENCOUNTER — TELEMEDICINE CLINICAL SUPPORT (OUTPATIENT)
Dept: PREADMISSION TESTING | Facility: HOSPITAL | Age: 25
End: 2025-03-13
Payer: COMMERCIAL

## 2025-03-13 DIAGNOSIS — D21.9 FIBROIDS: ICD-10-CM

## 2025-03-13 PROCEDURE — 99422 OL DIG E/M SVC 11-20 MIN: CPT | Performed by: NURSE PRACTITIONER

## 2025-03-13 ASSESSMENT — LIFESTYLE VARIABLES: SMOKING_STATUS: NONSMOKER

## 2025-03-13 ASSESSMENT — ENCOUNTER SYMPTOMS
NECK NEGATIVE: 1
NAUSEA: 1
ABDOMINAL PAIN: 1
ENDOCRINE NEGATIVE: 1
CARDIOVASCULAR NEGATIVE: 1
CONSTITUTIONAL NEGATIVE: 1
EYES NEGATIVE: 1
RESPIRATORY NEGATIVE: 1
BRUISES/BLEEDS EASILY: 1

## 2025-03-13 NOTE — CPM/PAT H&P
"CPM/PAT Evaluation       Name: Clifford Mandujano (Clifford Mandujano \"Ariel\")  /Age: 2000/ y.o.     Visit Type:   Virtual Visit   Virtual or Telephone Consent    An interactive audio and video telecommunication system which permits real time communications between the patient (at the originating site) and provider (at the distant site) was utilized to provide this telehealth service.  The patient confirmed their location in the [facility] at their home address.    Verbal consent was requested and obtained from Clifford Mandujano on this date, 25 for a telehealth visit and the patient's location was confirmed at the time of the visit.    Chief Complaint: perioperative evaluation      HPI  Clifford Mandujano is scheduled for EXCISION, MASS, UTERUS, ABDOMINAL APPROACH with Dr. Sanchez on 3/25/25.  Past Medical History:   Diagnosis Date    Allergic     Anemia     Iron Deficiency Anemia; venofer infusions.    Asthma     Chlamydial infection, unspecified 2020    Chlamydia    Dysfunctional uterine bleeding     Dysmenorrhea, unspecified 10/11/2018    Adolescent dysmenorrhea    Eczema     Encounter for other specified special examinations     Diagnostic skin test    Encounter for routine child health examination with abnormal findings 2018    Encounter for routine child health examination with abnormal findings    Fibroid 9/10/23    Fibroids     Headache     Hypermetropia, right eye 2019    Hypermetropia of right eye    Nosebleed     Other conditions influencing health status     Full-term infant    Other diseases of vocal cords     Vocal cord dysfunction    Palpitations     states she saw a cards provder when she was 11    Pelvic pain     Personal history of diseases of the skin and subcutaneous tissue 2018    History of acne    Personal history of other diseases of the nervous system and sense organs     History of migraine    Personal history of other endocrine, nutritional and " metabolic disease 05/06/2015    History of vitamin D deficiency    Personal history of other infectious and parasitic diseases 08/07/2018    History of candidiasis of vagina    Personal history of other specified conditions 04/29/2020    History of epistaxis    Personal history of other specified conditions 03/07/2016    History of headache       Past Surgical History:   Procedure Laterality Date    NO PAST SURGERIES         Patient  reports that she is not currently sexually active.    Family History   Problem Relation Name Age of Onset    Fibroids Mother Mom         had genetic testing for Cordero syndrome which was negative    Asthma Mother Mom     Diabetes Mother Mom     Hearing loss Mother Mom     Migraines Mother Mom     Eclampsia Mother Mom     Hypertension Mother Mom     Fibroids Maternal Grandmother Granny     Endometrial cancer Maternal Grandmother Granny     Stroke Maternal Grandmother Granny     Cancer Maternal Grandmother Granny     Diabetes Maternal Grandmother Granny     Hypertension Maternal Grandmother Sandra     Asthma Brother Ernesto Mandujano     Asthma Brother Naren Mandujano     Ovarian cancer Neg Hx      Colon cancer Neg Hx         Allergies   Allergen Reactions    Cat Dander Unknown    Dog Dander Unknown    Grass Pollen Unknown    Histamine Unknown    Histamine Phosphate Unknown    Ragweed Unknown    Weed Pollen-Short Ragweed Unknown       Prior to Admission medications    Medication Sig Start Date End Date Taking? Authorizing Provider   albuterol (Ventolin HFA) 90 mcg/actuation inhaler Inhale 2 puffs every 4 hours if needed for wheezing or shortness of breath. 10/22/24 10/22/25  Iftikhar Zamora,    cetirizine (ZyrTEC) 10 mg tablet Take 1 tablet (10 mg) by mouth once daily. 10/22/24 2/13/25  Iftikhar Zamora DO   docusate sodium (Colace) 100 mg capsule Take 1 capsule (100 mg) by mouth 2 times a day as needed for constipation. 11/25/24   Ivy Sanchez MD   fluticasone (Flonase) 50 mcg/actuation  nasal spray Administer 1 spray into each nostril once daily. Shake gently. Before first use, prime pump. After use, clean tip and replace cap. 10/22/24 10/22/25  Iftikhar Zamora,    mupirocin (Bactroban) 2 % ointment Apply a pea sized amount to the pad of the thumb, swipe into the nostril, sniff, then pinch the nose 2-3 times daily. Do NOT use a Q-tip. 1/28/25 5/8/25  ALEXANDRU Lombardo   naproxen (Naprosyn) 500 mg tablet Take 1 tablet (500 mg) by mouth 2 times a day.  Patient not taking: Reported on 1/28/2025 11/19/24   Isiah Mitchell PA-C   norethindrone (Aygestin) 5 mg tablet Take 2 tablets (10 mg) by mouth once daily. 2/26/25 2/26/26  Ivy Sanchez MD   oxymetazoline (Afrin) 0.05 % nasal spray Administer 2 sprays into each nostril every 12 hours if needed for congestion for up to 2 days. Do not use for more than 3 days. 1/18/25 6/17/25  ALEXANDRU Mejía        PAT ROS:   Constitutional:   neg    Neuro/Psych:   Eyes:   neg    Ears:   neg    Nose:   neg    Mouth:   neg    Throat:   neg    Neck:   neg    Cardio:   neg    Respiratory:   neg    Endocrine:   neg    GI:    abdominal pain   nausea  :   neg    Musculoskeletal:   Hematologic:    bruises/bleeds easily  Skin:      Physical Exam  Constitutional:       Appearance: Normal appearance.   HENT:      Head: Normocephalic.   Musculoskeletal:      Cervical back: Normal range of motion.   Neurological:      Mental Status: She is alert.          PAT AIRWAY:   Airway:     Mallampati::  Unable to assess    Neck ROM::  Full      Visit Vitals  OB Status Having periods   Smoking Status Never       DASI Risk Score      Flowsheet Row Clinical Support from 3/7/2025 in [facility]   Can you take care of yourself (eat, dress, bathe, or use toilet)?  2.75 filed at 03/07/2025 1518   Can you walk indoors, such as around your house? 1.75 filed at 03/07/2025 1518   Can you walk a block or two on level ground?  2.75 filed at 03/07/2025  1518   Can you climb a flight of stairs or walk up a hill? 5.5 filed at 03/07/2025 1518   Can you run a short distance? 8 filed at 03/07/2025 1518   Can you do light work around the house like dusting or washing dishes? 2.7 filed at 03/07/2025 1518   Can you do moderate work around the house like vacuuming, sweeping floors or carrying groceries? 3.5 filed at 03/07/2025 1518   Can you do heavy work around the house like scrubbing floors or lifting and moving heavy furniture?  8 filed at 03/07/2025 1518   Can you do yard work like raking leaves, weeding or pushing a mower? 4.5 filed at 03/07/2025 1518   Can you have sexual relations? 5.25 filed at 03/07/2025 1518   Can you participate in moderate recreational activities like golf, bowling, dancing, doubles tennis or throwing a baseball or football? 6 filed at 03/07/2025 1518   Can you participate in strenous sports like swimming, singles tennis, football, basketball, or skiing? 0 filed at 03/07/2025 1520   DASI SCORE 58.2 filed at 03/07/2025 1518   METS Score (Will be calculated only when all the questions are answered) 9.9 filed at 03/07/2025 1518          Caprini DVT Assessment    No data to display       Modified Frailty Index    No data to display       [id number] Stroke Risk Points  Current as of just now        N/A 0 to 9 Points:      Last Change: N/A          The [id number] risk score (Lip ANGELICA, et al. 2009. © 2010 American College [employer] Chest [employer]icians) quantifies the risk of stroke for a patient with atrial fibrillation. For patients without atrial fibrillation or under the age of 18 this score appears as N/A. Higher score values generally indicate higher risk of stroke.        This score is not applicable to this patient. Components are not calculated.          Revised Cardiac Risk Index    No data to display       Apfel Simplified Score    No data to display       Risk Analysis Index Results This Encounter    No data found in the last 10 encounters.        Prodigy: High Risk  Total Score: 0          ARISCAT Score for Postoperative Pulmonary Complications    No data to display       Marrero Perioperative Risk for Myocardial Infarction or Cardiac Arrest (JOSE)    No data to display       Recent Results (from the past 4 weeks)   CBC    Collection Time: 02/27/25  3:16 PM   Result Value Ref Range    WHITE BLOOD CELL COUNT 4.5 3.8 - 10.8 Thousand/uL    RED BLOOD CELL COUNT 4.43 3.80 - 5.10 Million/uL    HEMOGLOBIN 11.9 11.7 - 15.5 g/dL    HEMATOCRIT 37.5 35.0 - 45.0 %    MCV 84.7 80.0 - 100.0 fL    MCH 26.9 (L) 27.0 - 33.0 pg    MCHC 31.7 (L) 32.0 - 36.0 g/dL    RDW 20.6 (H) 11.0 - 15.0 %    PLATELET COUNT 318 140 - 400 Thousand/uL    MPV 11.0 7.5 - 12.5 fL     Diagnostic Results    CT head, CT FACIAL BONES WO IV CONTRAST; 1/16/25   IMPRESSION:   No acute intracranial abnormality.   No evidence of facial bone fracture.   Moderate right greater than left polypoid mucosal thickening of the   paranasal sinuses. Clinical correlation for sinusitis recommended.      MRI pelvis; 12/13/24  IMPRESSION:  1.  Enlarged multilobulated leiomyomatous uterus with some  demonstrating hemorrhagic degeneration. No evidence of endometriosis.      CT abd/pelvis; 11/19/24   IMPRESSION:  1. Heterogeneous and enlarged uterus with multiple soft tissue masses  compatible with leiomyomas, there is a cystic mass of the inferior  uterus which may represent a subacute degenerating leiomyoma, further  evaluation with pelvic ultrasound is recommended. No etiology for  lower abdominal tendinous is otherwise evident.  2. Trace amount of pelvic free fluid, likely physiologic in etiology.    US pelvis; 11/19/24   IMPRESSION:  1. Uterine fibroids, without significant interval change when  compared to prior.  2. Unremarkable sonographic appearance of the ovaries.      Assessment and Plan:     Anesthesia  The patient denies problems with anesthesia in the past such as PONV, prolonged sedation, awareness,  dental damage, aspiration, cardiac arrest, difficult intubation, or unexpected hospital admissions.     Neurology  The patient has no neurological diagnoses or significant findings on chart review, clinical presentation, and evaluation. No grossly apparent neurological perioperative risk. The patient is at increased risk for perioperative stroke secondary to female gender, general anesthesia, operative time >2.5 hours. Handouts for preoperative brain exercises given to patient.    HEENT/Airway  No diagnoses, significant findings on chart review, clinical presentation, or evaluation. No documented or reported history of airway difficulty.   ED visit: 1/16/25, concussion & 1/18/25, continued headache and nose bleeds   Head CT benign for intracranial process, possible sinusitis    Cardiovascular  No diagnoses or significant findings on chart review or clinical presentation and evaluation.    METS  The patient's functional capacity is greater than 4 METS.  RCRI  The patient meets 1 RCRI criteria and therefore has a 6% risk of major adverse cardiac complications.  JOSE score which indicates a 0% risk of intraoperative or 30-day postoperative MACE (major adverse cardiac event).    Cardiology Evaluation  The patient is not followed by cardiology.    She is scheduled for non-cardiac surgery associated with elevated risk. The patient has no major cardiac contraindications to non- cardiac surgery.    Pulmonary  The patient has mild persistent asthma, controlled on inhalers. Denies any recent URIs, exacerbations or hospitalizations     ARISCAT 16, low, 1.6% risk of in-hospital postoperative pulmonary complications  PRODIGY 3, low risk of respiratory depression episode.     Patient given PI sheet for preoperative deep breathing exercises.  Encourage  incentive spirometry in the postoperative period as deemed necessary.    Endocrine  No diagnoses or significant findings on chart review or clinical presentation and  evaluation.    Gastrointestinal  No diagnoses or significant findings on chart review or clinical presentation and evaluation.    Genitourinary  The patient has painful periods since menarche with severe cramping and nausea. Scheduled for surgery with Dr. Sanchez on 3/25/25.    Renal  No renal diagnoses or significant findings on chart review or clinical presentation and evaluation. The patient is scheduled for peritoneal surgery which places patient at higher risk of perioperative renal complications.    Hematology  The patient has history of JODIE. CBC obtained 2/27.    Non-Onc Infusion: JUAN DIEGO Leal-CNP 1/20/25 Patient to be scheduled for venofer infusions. For Diagnosis: Iron Deficiency Anemia     Caprini score 4, intermediate risk of perioperative VTE.     Patient instructed to ambulate as soon as possible postoperatively to decrease thromboembolic risk. Initiate mechanical DVT prophylaxis as soon as possible and initiate chemical prophylaxis when deemed safe from a bleeding standpoint post surgery.     Transfusion Evaluation  T&S not obtained. Low likelihood for perioperative transfusion of blood or blood products.    Musculoskeletal  No diagnoses or significant findings on chart review or clinical presentation and evaluation.    ID  No diagnoses or significant findings on chart review or clinical presentation and evaluation.    -Preoperative medication instructions were provided and reviewed with the patient.  Any additional testing or evaluation was explained to the patient.  NPO Instructions were discussed, and the patient's questions were answered prior to conclusion of this encounter. Patient verbalized understanding of preoperative instructions. After Visit Summary given.

## 2025-03-13 NOTE — PREPROCEDURE INSTRUCTIONS
Fasting Guidelines    NPO Instructions:    Do not eat any food after midnight the night before your surgery/procedure.  You may have up to 13.5 ounces of clear liquids until TWO hours before your instructed arrival time to the hospital. This includes water, black tea/coffee, (no milk or cream), apple juice, and/or electrolyte drinks (Gatorade).  You may chew gum up to TWO hours before your surgery/procedure.    Additional Instructions:    Avoid herbal supplements, multivitamins and NSAIDS (non-steroidal anti-inflammatory drugs) such as Advil, Aleve, Ibuprofen, Naproxen, Excedrin, Meloxicam or Celebrex for at least 7 days prior to surgery. May take Tylenol as needed.    Avoid tobacco and alcohol products for 24 hours prior to surgery.    CONTACT SURGEON'S OFFICE IF YOU DEVELOP:  * Fever = 100.4 F   * New respiratory symptoms (e.g. cough, shortness of breath, respiratory distress, sore throat)  * Recent loss of taste or smell  *Flu like symptoms such as headache, fatigue or gastrointestinal symptoms  * You develop any open sores, shingles, burning or painful urination   AND/OR:  * You no longer wish to have the surgery.  * Any other personal circumstances change that may lead to the need to cancel or defer this surgery.  *You were admitted to any hospital within one week of your planned procedure.    Seven/Six Days before Surgery:  Review your medication instructions, stop indicated medications    Day of Surgery:  Review your medication instructions, take indicated medications  Wear comfortable loose fitting clothing  Do not use moisturizers, creams, lotions or perfume  All jewelry and valuables should be left at home    [facility] [employer]  [facility]  [phone]  [phone]  Email-[email or web address]      Preoperative Brain Exercises    What are brain exercises?  A brain exercise is any activity that engages your thinking (cognitive) skills.    What types of  activities are considered brain exercises?  Jigsaw puzzles, crossword puzzles, word jumble, memory games, word search, and many more.  Many can be found free online or on your phone via a mobile demetrius.    Why should I do brain exercises before my surgery?  More recent research has shown brain exercise before surgery can lower the risk of postoperative delirium (confusion) which can be especially important for older adults.  Patients who did brain exercises for 5 to 10 hours the days before surgery, cut their risk of postoperative delirium in half up to 1 week after surgery.         [facility]    Preoperative Deep Breathing Exercises    Why it is important to do deep breathing exercises before my surgery?  Deep breathing exercises strengthen your breathing muscles.  This helps you to recover after your surgery and decreases the chance of breathing complications.      How are the deep breathing exercises done?  Sit straight with your back supported.  Breathe in deeply and slowly through your nose. Your lower rib cage should expand and your abdomen may move forward.  Hold that breath for 3 to 5 seconds.  Breathe out through pursed lips, slowly and completely.  Rest and repeat 10 times every hour while awake.  Rest longer if you become dizzy or lightheaded.         Patient and Family Education             Ways You Can Help Prevent Blood Clots             This handout explains some simple things you can do to help prevent blood clots.      Blood clots are blockages that can form in the body's veins. When a blood clot forms in your deep veins, it may be called a deep vein thrombosis, or DVT for short. Blood clots can happen in any part of the body where blood flows, but they are most common in the arms and legs. If a piece of a blood clot breaks free and travels to the lungs, it is called a pulmonary embolus (PE). A PE can be a very serious problem.         Being in the hospital or having surgery  can raise your chances of getting a blood clot because you may not be well enough to move around as much as you normally do.         Ways you can help prevent blood clots in the hospital         Wearing SCDs. SCDs stands for Sequential Compression Devices.   SCDs are special sleeves that wrap around your legs  They attach to a pump that fills them with air to gently squeeze your legs every few minutes.   This helps return the blood in your legs to your heart.   SCDs should only be taken off when walking or bathing.   SCDs may not be comfortable, but they can help save your life.               Wearing compression stockings - if your doctor orders them. These special snug fitting stockings gently squeeze your legs to help blood flow.       Walking. Walking helps move the blood in your legs.   If your doctor says it is ok, try walking the halls at least   5 times a day. Ask us to help you get up, so you don't fall.      Taking any blood thinning medicines your doctor orders.        Page 1 of 2     [facility]; 3/23   Ways you can help prevent blood clots at home       Wearing compression stockings - if your doctor orders them. ? Walking - to help move the blood in your legs.       Taking any blood thinning medicines your doctor orders.      Signs of a blood clot or PE      Tell your doctor or nurse know right away if you have of the problems listed below.    If you are at home, seek medical care right away. Call 911 for chest pain or problems breathing.               Signs of a blood clot (DVT) - such as pain,  swelling, redness or warmth in your arm or leg      Signs of a pulmonary embolism (PE) - such as chest     pain or feeling short of breath

## 2025-03-23 NOTE — HOSPITAL COURSE
"Patient was admitted on 3/25 for scheduled surgery. She underwent an open myomectomy. Her procedure was uncomplicated, see the operative report for full details. By POD#2 she was meeting all postoperative milestones including: tolerating PO diet and medications, voiding, ambulating. Her pain was well controlled with PO pain medications. She was appropriate for discharge home and will follow up as an outpatient with Dr. Sanchez.    ____________________________________  Admission HPI:  Clifford Mandujano \"Ariel\" is a 24 y.o. with multifibroid uterus presenting for abdominal myomectomy.    Pertinent PMH: asthma, fibroids  Pertient PSH: none  Imaging:   --TVUS:  uterus measures  8.8 cm x 9.6 cm x 11.3 cm. Multiple  heterogeneous, hypoechoic uterine lesions are identified with posterior shadowing most consistent with leiomyomas. Normal adnexa  --MRI: The uterus is anteverted and is markedly enlarged in size with  multilobulated appearance due to multiple enlarged well-circumscribed intramural fibroids. The largest fibroid measures 5.3 x 5.8 cm. Enlarged multilobulated leiomyomatous uterus with some demonstrating hemorrhagic degeneration. No evidence of endometriosis.      "

## 2025-03-24 ENCOUNTER — ANESTHESIA EVENT (OUTPATIENT)
Dept: OPERATING ROOM | Facility: HOSPITAL | Age: 25
End: 2025-03-24
Payer: COMMERCIAL

## 2025-03-24 NOTE — PROGRESS NOTES
"Pharmacy Medication History Review    Clifford Mandujano \"Ariel\" is a 24 y.o. female who is planned to be admitted for Fibroids. Pharmacy called the patient prior to their scheduled procedure and reviewed the patient's [id number] medications for accuracy.    Medications ADDED:  none  Medications CHANGED:  none  Medications REMOVED:   none    Please review updated prior to admission medication list and comments regarding how patient may be taking medications differently by going to Admission tab --> Admission Orders --> Admit Orders / Review prior to admission medications.     Preferred pharmacy, last doses of medications, and allergies to be confirmed with patient by nursing the day of procedure.     Sources used to complete the med history include:  [facility]  Pharmacy dispense history  Patient Interview Good historian  Chart Review  Care Everywhere     Below are additional concerns with the patient's PTA list.  Patient confirmed they are taking #1 tablet of zyrtec 10mg twice daily. (OTC)  Patient uses their albuterol inhaler PRN, not daily    Margarita Peters CPhT  Please reach out via Secure Chat for questions   "

## 2025-03-25 ENCOUNTER — HOSPITAL ENCOUNTER (INPATIENT)
Facility: HOSPITAL | Age: 25
LOS: 2 days | Discharge: HOME | End: 2025-03-27
Attending: STUDENT IN AN ORGANIZED HEALTH CARE EDUCATION/TRAINING PROGRAM | Admitting: STUDENT IN AN ORGANIZED HEALTH CARE EDUCATION/TRAINING PROGRAM
Payer: COMMERCIAL

## 2025-03-25 ENCOUNTER — SURGERY (OUTPATIENT)
Age: 25
End: 2025-03-25
Payer: COMMERCIAL

## 2025-03-25 ENCOUNTER — ANESTHESIA (OUTPATIENT)
Dept: OPERATING ROOM | Facility: HOSPITAL | Age: 25
End: 2025-03-25
Payer: COMMERCIAL

## 2025-03-25 DIAGNOSIS — G89.18 POSTOPERATIVE PAIN: ICD-10-CM

## 2025-03-25 DIAGNOSIS — R52 POSTPARTUM PAIN (HHS-HCC): ICD-10-CM

## 2025-03-25 DIAGNOSIS — K59.00 CONSTIPATION, UNSPECIFIED CONSTIPATION TYPE: ICD-10-CM

## 2025-03-25 DIAGNOSIS — D21.9 FIBROIDS: Primary | ICD-10-CM

## 2025-03-25 LAB
ABO GROUP (TYPE) IN BLOOD: NORMAL
ABO GROUP (TYPE) IN BLOOD: NORMAL
ANTIBODY SCREEN: NORMAL
PREGNANCY TEST URINE, POC: NEGATIVE
RH FACTOR (ANTIGEN D): NORMAL
RH FACTOR (ANTIGEN D): NORMAL

## 2025-03-25 PROCEDURE — 0UB90ZZ EXCISION OF UTERUS, OPEN APPROACH: ICD-10-PCS | Performed by: STUDENT IN AN ORGANIZED HEALTH CARE EDUCATION/TRAINING PROGRAM

## 2025-03-25 PROCEDURE — 86901 BLOOD TYPING SEROLOGIC RH(D): CPT | Performed by: STUDENT IN AN ORGANIZED HEALTH CARE EDUCATION/TRAINING PROGRAM

## 2025-03-25 PROCEDURE — 3600000003 HC OR TIME - INITIAL BASE CHARGE - PROCEDURE LEVEL THREE: Performed by: STUDENT IN AN ORGANIZED HEALTH CARE EDUCATION/TRAINING PROGRAM

## 2025-03-25 PROCEDURE — 2500000004 HC RX 250 GENERAL PHARMACY W/ HCPCS (ALT 636 FOR OP/ED): Mod: JZ,SE

## 2025-03-25 PROCEDURE — 2500000002 HC RX 250 W HCPCS SELF ADMINISTERED DRUGS (ALT 637 FOR MEDICARE OP, ALT 636 FOR OP/ED): Mod: SE | Performed by: STUDENT IN AN ORGANIZED HEALTH CARE EDUCATION/TRAINING PROGRAM

## 2025-03-25 PROCEDURE — 3700000002 HC GENERAL ANESTHESIA TIME - EACH INCREMENTAL 1 MINUTE: Performed by: STUDENT IN AN ORGANIZED HEALTH CARE EDUCATION/TRAINING PROGRAM

## 2025-03-25 PROCEDURE — 2500000004 HC RX 250 GENERAL PHARMACY W/ HCPCS (ALT 636 FOR OP/ED): Mod: SE

## 2025-03-25 PROCEDURE — 2500000005 HC RX 250 GENERAL PHARMACY W/O HCPCS: Mod: SE

## 2025-03-25 PROCEDURE — 2500000004 HC RX 250 GENERAL PHARMACY W/ HCPCS (ALT 636 FOR OP/ED): Mod: SE | Performed by: STUDENT IN AN ORGANIZED HEALTH CARE EDUCATION/TRAINING PROGRAM

## 2025-03-25 PROCEDURE — C1765 ADHESION BARRIER: HCPCS | Performed by: STUDENT IN AN ORGANIZED HEALTH CARE EDUCATION/TRAINING PROGRAM

## 2025-03-25 PROCEDURE — 36415 COLL VENOUS BLD VENIPUNCTURE: CPT | Performed by: STUDENT IN AN ORGANIZED HEALTH CARE EDUCATION/TRAINING PROGRAM

## 2025-03-25 PROCEDURE — 1210000001 HC SEMI-PRIVATE ROOM DAILY

## 2025-03-25 PROCEDURE — 96372 THER/PROPH/DIAG INJ SC/IM: CPT

## 2025-03-25 PROCEDURE — 81025 URINE PREGNANCY TEST: CPT | Performed by: STUDENT IN AN ORGANIZED HEALTH CARE EDUCATION/TRAINING PROGRAM

## 2025-03-25 PROCEDURE — 3600000008 HC OR TIME - EACH INCREMENTAL 1 MINUTE - PROCEDURE LEVEL THREE: Performed by: STUDENT IN AN ORGANIZED HEALTH CARE EDUCATION/TRAINING PROGRAM

## 2025-03-25 PROCEDURE — 3700000001 HC GENERAL ANESTHESIA TIME - INITIAL BASE CHARGE: Performed by: STUDENT IN AN ORGANIZED HEALTH CARE EDUCATION/TRAINING PROGRAM

## 2025-03-25 PROCEDURE — 2500000001 HC RX 250 WO HCPCS SELF ADMINISTERED DRUGS (ALT 637 FOR MEDICARE OP): Mod: SE

## 2025-03-25 PROCEDURE — 7100000002 HC RECOVERY ROOM TIME - EACH INCREMENTAL 1 MINUTE: Performed by: STUDENT IN AN ORGANIZED HEALTH CARE EDUCATION/TRAINING PROGRAM

## 2025-03-25 PROCEDURE — 2500000001 HC RX 250 WO HCPCS SELF ADMINISTERED DRUGS (ALT 637 FOR MEDICARE OP): Mod: SE | Performed by: STUDENT IN AN ORGANIZED HEALTH CARE EDUCATION/TRAINING PROGRAM

## 2025-03-25 PROCEDURE — 2780000003 HC OR 278 NO HCPCS: Performed by: STUDENT IN AN ORGANIZED HEALTH CARE EDUCATION/TRAINING PROGRAM

## 2025-03-25 PROCEDURE — 2500000005 HC RX 250 GENERAL PHARMACY W/O HCPCS: Mod: SE | Performed by: STUDENT IN AN ORGANIZED HEALTH CARE EDUCATION/TRAINING PROGRAM

## 2025-03-25 PROCEDURE — 7100000001 HC RECOVERY ROOM TIME - INITIAL BASE CHARGE: Performed by: STUDENT IN AN ORGANIZED HEALTH CARE EDUCATION/TRAINING PROGRAM

## 2025-03-25 PROCEDURE — 2720000007 HC OR 272 NO HCPCS: Performed by: STUDENT IN AN ORGANIZED HEALTH CARE EDUCATION/TRAINING PROGRAM

## 2025-03-25 PROCEDURE — A58146 PR MYOMECTOMY 5/>,TOT>250 GMS,ABD APPRCH: Performed by: ANESTHESIOLOGY

## 2025-03-25 PROCEDURE — 99222 1ST HOSP IP/OBS MODERATE 55: CPT | Performed by: STUDENT IN AN ORGANIZED HEALTH CARE EDUCATION/TRAINING PROGRAM

## 2025-03-25 RX ORDER — SODIUM CHLORIDE, SODIUM LACTATE, POTASSIUM CHLORIDE, CALCIUM CHLORIDE 600; 310; 30; 20 MG/100ML; MG/100ML; MG/100ML; MG/100ML
40 INJECTION, SOLUTION INTRAVENOUS CONTINUOUS
Status: DISCONTINUED | OUTPATIENT
Start: 2025-03-25 | End: 2025-03-26

## 2025-03-25 RX ORDER — OXYCODONE HYDROCHLORIDE 5 MG/1
10 TABLET ORAL EVERY 4 HOURS PRN
Status: DISCONTINUED | OUTPATIENT
Start: 2025-03-25 | End: 2025-03-25

## 2025-03-25 RX ORDER — ACETAMINOPHEN 325 MG/1
650 TABLET ORAL EVERY 4 HOURS PRN
Status: DISCONTINUED | OUTPATIENT
Start: 2025-03-25 | End: 2025-03-25

## 2025-03-25 RX ORDER — HYDROMORPHONE HYDROCHLORIDE 0.2 MG/ML
0.1 INJECTION INTRAMUSCULAR; INTRAVENOUS; SUBCUTANEOUS EVERY 5 MIN PRN
Status: DISCONTINUED | OUTPATIENT
Start: 2025-03-25 | End: 2025-03-25

## 2025-03-25 RX ORDER — METOPROLOL TARTRATE 1 MG/ML
INJECTION, SOLUTION INTRAVENOUS AS NEEDED
Status: DISCONTINUED | OUTPATIENT
Start: 2025-03-25 | End: 2025-03-25

## 2025-03-25 RX ORDER — ACETAMINOPHEN 325 MG/1
650 TABLET ORAL EVERY 4 HOURS PRN
Status: DISCONTINUED | OUTPATIENT
Start: 2025-03-25 | End: 2025-03-26

## 2025-03-25 RX ORDER — HYDROMORPHONE HYDROCHLORIDE 1 MG/ML
INJECTION, SOLUTION INTRAMUSCULAR; INTRAVENOUS; SUBCUTANEOUS AS NEEDED
Status: DISCONTINUED | OUTPATIENT
Start: 2025-03-25 | End: 2025-03-25

## 2025-03-25 RX ORDER — ALBUTEROL SULFATE 90 UG/1
2 INHALANT RESPIRATORY (INHALATION) EVERY 4 HOURS PRN
Status: DISCONTINUED | OUTPATIENT
Start: 2025-03-25 | End: 2025-03-27 | Stop reason: HOSPADM

## 2025-03-25 RX ORDER — ONDANSETRON HYDROCHLORIDE 2 MG/ML
4 INJECTION, SOLUTION INTRAVENOUS EVERY 6 HOURS PRN
Status: DISCONTINUED | OUTPATIENT
Start: 2025-03-25 | End: 2025-03-27 | Stop reason: HOSPADM

## 2025-03-25 RX ORDER — OXYCODONE HYDROCHLORIDE 5 MG/1
5 TABLET ORAL EVERY 4 HOURS PRN
Status: DISCONTINUED | OUTPATIENT
Start: 2025-03-25 | End: 2025-03-27 | Stop reason: HOSPADM

## 2025-03-25 RX ORDER — CELECOXIB 200 MG/1
400 CAPSULE ORAL ONCE
Status: COMPLETED | OUTPATIENT
Start: 2025-03-25 | End: 2025-03-25

## 2025-03-25 RX ORDER — ROCURONIUM BROMIDE 10 MG/ML
INJECTION, SOLUTION INTRAVENOUS AS NEEDED
Status: DISCONTINUED | OUTPATIENT
Start: 2025-03-25 | End: 2025-03-25

## 2025-03-25 RX ORDER — TRANEXAMIC ACID 100 MG/ML
INJECTION, SOLUTION INTRAVENOUS AS NEEDED
Status: DISCONTINUED | OUTPATIENT
Start: 2025-03-25 | End: 2025-03-25

## 2025-03-25 RX ORDER — OXYCODONE HYDROCHLORIDE 10 MG/1
10 TABLET ORAL EVERY 4 HOURS PRN
Status: DISCONTINUED | OUTPATIENT
Start: 2025-03-25 | End: 2025-03-27 | Stop reason: HOSPADM

## 2025-03-25 RX ORDER — HYDRALAZINE HYDROCHLORIDE 20 MG/ML
5 INJECTION INTRAMUSCULAR; INTRAVENOUS EVERY 30 MIN PRN
Status: DISCONTINUED | OUTPATIENT
Start: 2025-03-25 | End: 2025-03-25

## 2025-03-25 RX ORDER — ROPIVACAINE HYDROCHLORIDE 5 MG/ML
INJECTION, SOLUTION EPIDURAL; INFILTRATION; PERINEURAL AS NEEDED
Status: DISCONTINUED | OUTPATIENT
Start: 2025-03-25 | End: 2025-03-25

## 2025-03-25 RX ORDER — POLYETHYLENE GLYCOL 3350 17 G/17G
17 POWDER, FOR SOLUTION ORAL DAILY
Status: DISCONTINUED | OUTPATIENT
Start: 2025-03-25 | End: 2025-03-27 | Stop reason: HOSPADM

## 2025-03-25 RX ORDER — HYDROMORPHONE HYDROCHLORIDE 0.2 MG/ML
0.2 INJECTION INTRAMUSCULAR; INTRAVENOUS; SUBCUTANEOUS EVERY 5 MIN PRN
Status: DISCONTINUED | OUTPATIENT
Start: 2025-03-25 | End: 2025-03-25

## 2025-03-25 RX ORDER — SODIUM CHLORIDE 0.9 G/100ML
INJECTION, SOLUTION IRRIGATION AS NEEDED
Status: DISCONTINUED | OUTPATIENT
Start: 2025-03-25 | End: 2025-03-25 | Stop reason: HOSPADM

## 2025-03-25 RX ORDER — PROPOFOL 10 MG/ML
INJECTION, EMULSION INTRAVENOUS AS NEEDED
Status: DISCONTINUED | OUTPATIENT
Start: 2025-03-25 | End: 2025-03-25

## 2025-03-25 RX ORDER — GABAPENTIN 600 MG/1
600 TABLET ORAL ONCE
Status: COMPLETED | OUTPATIENT
Start: 2025-03-25 | End: 2025-03-25

## 2025-03-25 RX ORDER — FENTANYL CITRATE 50 UG/ML
INJECTION, SOLUTION INTRAMUSCULAR; INTRAVENOUS AS NEEDED
Status: DISCONTINUED | OUTPATIENT
Start: 2025-03-25 | End: 2025-03-25

## 2025-03-25 RX ORDER — ACETAMINOPHEN 325 MG/1
975 TABLET ORAL ONCE
Status: COMPLETED | OUTPATIENT
Start: 2025-03-25 | End: 2025-03-25

## 2025-03-25 RX ORDER — DEXAMETHASONE SODIUM PHOSPHATE 10 MG/ML
INJECTION INTRAMUSCULAR; INTRAVENOUS AS NEEDED
Status: DISCONTINUED | OUTPATIENT
Start: 2025-03-25 | End: 2025-03-25

## 2025-03-25 RX ORDER — DOCUSATE SODIUM 100 MG/1
100 CAPSULE, LIQUID FILLED ORAL 2 TIMES DAILY
Status: DISCONTINUED | OUTPATIENT
Start: 2025-03-25 | End: 2025-03-27 | Stop reason: HOSPADM

## 2025-03-25 RX ORDER — METOCLOPRAMIDE HYDROCHLORIDE 5 MG/ML
10 INJECTION INTRAMUSCULAR; INTRAVENOUS EVERY 6 HOURS PRN
Status: DISCONTINUED | OUTPATIENT
Start: 2025-03-25 | End: 2025-03-27 | Stop reason: HOSPADM

## 2025-03-25 RX ORDER — LIDOCAINE HYDROCHLORIDE 20 MG/ML
INJECTION, SOLUTION INFILTRATION; PERINEURAL AS NEEDED
Status: DISCONTINUED | OUTPATIENT
Start: 2025-03-25 | End: 2025-03-25

## 2025-03-25 RX ORDER — CETIRIZINE HYDROCHLORIDE 10 MG/1
10 TABLET ORAL DAILY
Status: DISCONTINUED | OUTPATIENT
Start: 2025-03-25 | End: 2025-03-27 | Stop reason: HOSPADM

## 2025-03-25 RX ORDER — SIMETHICONE 80 MG
80 TABLET,CHEWABLE ORAL 4 TIMES DAILY PRN
Status: DISCONTINUED | OUTPATIENT
Start: 2025-03-25 | End: 2025-03-27 | Stop reason: HOSPADM

## 2025-03-25 RX ORDER — MIDAZOLAM HYDROCHLORIDE 1 MG/ML
INJECTION INTRAMUSCULAR; INTRAVENOUS AS NEEDED
Status: DISCONTINUED | OUTPATIENT
Start: 2025-03-25 | End: 2025-03-25

## 2025-03-25 RX ORDER — DROPERIDOL 2.5 MG/ML
0.62 INJECTION, SOLUTION INTRAMUSCULAR; INTRAVENOUS ONCE AS NEEDED
Status: DISCONTINUED | OUTPATIENT
Start: 2025-03-25 | End: 2025-03-25

## 2025-03-25 RX ORDER — HYDROMORPHONE HYDROCHLORIDE 1 MG/ML
0.2 INJECTION, SOLUTION INTRAMUSCULAR; INTRAVENOUS; SUBCUTANEOUS EVERY 4 HOURS PRN
Status: DISCONTINUED | OUTPATIENT
Start: 2025-03-25 | End: 2025-03-27 | Stop reason: HOSPADM

## 2025-03-25 RX ORDER — OXYCODONE HYDROCHLORIDE 5 MG/1
5 TABLET ORAL EVERY 4 HOURS PRN
Status: DISCONTINUED | OUTPATIENT
Start: 2025-03-25 | End: 2025-03-25

## 2025-03-25 RX ORDER — ONDANSETRON HYDROCHLORIDE 2 MG/ML
INJECTION, SOLUTION INTRAVENOUS AS NEEDED
Status: DISCONTINUED | OUTPATIENT
Start: 2025-03-25 | End: 2025-03-25

## 2025-03-25 RX ORDER — IBUPROFEN 600 MG/1
600 TABLET ORAL EVERY 6 HOURS PRN
Status: DISCONTINUED | OUTPATIENT
Start: 2025-03-25 | End: 2025-03-26

## 2025-03-25 RX ORDER — WATER 1 ML/ML
INJECTION IRRIGATION AS NEEDED
Status: DISCONTINUED | OUTPATIENT
Start: 2025-03-25 | End: 2025-03-25 | Stop reason: HOSPADM

## 2025-03-25 RX ORDER — ROPIVACAINE IN 0.9% SOD CHL/PF 0.2 %
14 PLASTIC BAG, INJECTION (ML) EPIDURAL CONTINUOUS
Status: DISCONTINUED | OUTPATIENT
Start: 2025-03-25 | End: 2025-03-27 | Stop reason: HOSPADM

## 2025-03-25 RX ORDER — CEFAZOLIN 1 G/1
INJECTION, POWDER, FOR SOLUTION INTRAVENOUS AS NEEDED
Status: DISCONTINUED | OUTPATIENT
Start: 2025-03-25 | End: 2025-03-25

## 2025-03-25 RX ORDER — ESMOLOL HYDROCHLORIDE 10 MG/ML
INJECTION INTRAVENOUS AS NEEDED
Status: DISCONTINUED | OUTPATIENT
Start: 2025-03-25 | End: 2025-03-25

## 2025-03-25 RX ORDER — ONDANSETRON 4 MG/1
4 TABLET, FILM COATED ORAL EVERY 6 HOURS PRN
Status: DISCONTINUED | OUTPATIENT
Start: 2025-03-25 | End: 2025-03-27 | Stop reason: HOSPADM

## 2025-03-25 RX ORDER — METOCLOPRAMIDE 10 MG/1
10 TABLET ORAL EVERY 6 HOURS PRN
Status: DISCONTINUED | OUTPATIENT
Start: 2025-03-25 | End: 2025-03-27 | Stop reason: HOSPADM

## 2025-03-25 RX ORDER — LIDOCAINE HYDROCHLORIDE 10 MG/ML
0.1 INJECTION, SOLUTION EPIDURAL; INFILTRATION; INTRACAUDAL; PERINEURAL ONCE
Status: DISCONTINUED | OUTPATIENT
Start: 2025-03-25 | End: 2025-03-25

## 2025-03-25 RX ORDER — MISOPROSTOL 200 UG/1
TABLET ORAL AS NEEDED
Status: DISCONTINUED | OUTPATIENT
Start: 2025-03-25 | End: 2025-03-25 | Stop reason: HOSPADM

## 2025-03-25 RX ORDER — DROPERIDOL 2.5 MG/ML
INJECTION, SOLUTION INTRAMUSCULAR; INTRAVENOUS AS NEEDED
Status: DISCONTINUED | OUTPATIENT
Start: 2025-03-25 | End: 2025-03-25

## 2025-03-25 RX ORDER — KETOROLAC TROMETHAMINE 30 MG/ML
INJECTION, SOLUTION INTRAMUSCULAR; INTRAVENOUS AS NEEDED
Status: DISCONTINUED | OUTPATIENT
Start: 2025-03-25 | End: 2025-03-25

## 2025-03-25 RX ORDER — ONDANSETRON HYDROCHLORIDE 2 MG/ML
4 INJECTION, SOLUTION INTRAVENOUS ONCE AS NEEDED
Status: COMPLETED | OUTPATIENT
Start: 2025-03-25 | End: 2025-03-25

## 2025-03-25 RX ORDER — LABETALOL HYDROCHLORIDE 5 MG/ML
5 INJECTION, SOLUTION INTRAVENOUS ONCE AS NEEDED
Status: DISCONTINUED | OUTPATIENT
Start: 2025-03-25 | End: 2025-03-25

## 2025-03-25 RX ADMIN — Medication 8 L/MIN: at 15:05

## 2025-03-25 RX ADMIN — ROCURONIUM 10 MG: 50 INJECTION, SOLUTION INTRAVENOUS at 13:56

## 2025-03-25 RX ADMIN — MISOPROSTOL 400 MCG: 200 TABLET ORAL at 11:57

## 2025-03-25 RX ADMIN — ROCURONIUM 50 MG: 50 INJECTION, SOLUTION INTRAVENOUS at 11:49

## 2025-03-25 RX ADMIN — HYDROMORPHONE HYDROCHLORIDE 0.2 MG: 1 INJECTION, SOLUTION INTRAMUSCULAR; INTRAVENOUS; SUBCUTANEOUS at 14:42

## 2025-03-25 RX ADMIN — CELECOXIB 400 MG: 200 CAPSULE ORAL at 10:39

## 2025-03-25 RX ADMIN — ESMOLOL HYDROCHLORIDE 40 MG: 10 INJECTION, SOLUTION INTRAVENOUS at 11:52

## 2025-03-25 RX ADMIN — DROPERIDOL 0.62 MG: 2.5 INJECTION, SOLUTION INTRAMUSCULAR; INTRAVENOUS at 14:27

## 2025-03-25 RX ADMIN — GABAPENTIN 600 MG: 600 TABLET, FILM COATED ORAL at 10:39

## 2025-03-25 RX ADMIN — ONDANSETRON 4 MG: 2 INJECTION, SOLUTION INTRAMUSCULAR; INTRAVENOUS at 14:54

## 2025-03-25 RX ADMIN — OXYCODONE 5 MG: 5 TABLET ORAL at 18:15

## 2025-03-25 RX ADMIN — VASOPRESSIN 6 ML: 20 INJECTION, SOLUTION INTRAMUSCULAR; SUBCUTANEOUS at 12:37

## 2025-03-25 RX ADMIN — HYDROMORPHONE HYDROCHLORIDE 0.4 MG: 1 INJECTION, SOLUTION INTRAMUSCULAR; INTRAVENOUS; SUBCUTANEOUS at 12:43

## 2025-03-25 RX ADMIN — CEFAZOLIN 2 G: 1 INJECTION, POWDER, FOR SOLUTION INTRAMUSCULAR; INTRAVENOUS at 11:49

## 2025-03-25 RX ADMIN — ONDANSETRON 4 MG: 2 INJECTION INTRAMUSCULAR; INTRAVENOUS at 16:30

## 2025-03-25 RX ADMIN — HYDROMORPHONE HYDROCHLORIDE 0.2 MG: 0.2 INJECTION, SOLUTION INTRAMUSCULAR; INTRAVENOUS; SUBCUTANEOUS at 15:24

## 2025-03-25 RX ADMIN — SODIUM CHLORIDE, SODIUM LACTATE, POTASSIUM CHLORIDE, AND CALCIUM CHLORIDE: 600; 310; 30; 20 INJECTION, SOLUTION INTRAVENOUS at 11:47

## 2025-03-25 RX ADMIN — KETOROLAC TROMETHAMINE 30 MG: 30 INJECTION, SOLUTION INTRAMUSCULAR; INTRAVENOUS at 14:39

## 2025-03-25 RX ADMIN — WATER 3000 ML: 100 IRRIGANT IRRIGATION at 12:44

## 2025-03-25 RX ADMIN — SODIUM CHLORIDE 1000 ML: 900 IRRIGANT IRRIGATION at 12:17

## 2025-03-25 RX ADMIN — TRANEXAMIC ACID 1000 MG: 100 INJECTION INTRAVENOUS at 11:58

## 2025-03-25 RX ADMIN — OXYCODONE 5 MG: 5 TABLET ORAL at 23:43

## 2025-03-25 RX ADMIN — VASOPRESSIN 24 ML: 20 INJECTION, SOLUTION INTRAMUSCULAR; SUBCUTANEOUS at 12:50

## 2025-03-25 RX ADMIN — ACETAMINOPHEN 975 MG: 325 TABLET, FILM COATED ORAL at 10:38

## 2025-03-25 RX ADMIN — METOPROLOL TARTRATE 2 MG: 5 INJECTION, SOLUTION INTRAVENOUS at 11:54

## 2025-03-25 RX ADMIN — MIDAZOLAM HYDROCHLORIDE 2 MG: 2 INJECTION, SOLUTION INTRAMUSCULAR; INTRAVENOUS at 11:37

## 2025-03-25 RX ADMIN — Medication 1 APPLICATION: at 12:03

## 2025-03-25 RX ADMIN — ROPIVACAINE HYDROCHLORIDE 15 ML: 5 INJECTION, SOLUTION EPIDURAL; INFILTRATION; PERINEURAL at 10:38

## 2025-03-25 RX ADMIN — ESMOLOL HYDROCHLORIDE 30 MG: 10 INJECTION, SOLUTION INTRAVENOUS at 12:03

## 2025-03-25 RX ADMIN — LIDOCAINE HYDROCHLORIDE 70 MG: 20 INJECTION, SOLUTION INFILTRATION; PERINEURAL at 11:48

## 2025-03-25 RX ADMIN — SODIUM CHLORIDE, POTASSIUM CHLORIDE, SODIUM LACTATE AND CALCIUM CHLORIDE 40 ML/HR: 600; 310; 30; 20 INJECTION, SOLUTION INTRAVENOUS at 17:50

## 2025-03-25 RX ADMIN — DEXAMETHASONE SODIUM PHOSPHATE 10 MG: 10 INJECTION, SOLUTION INTRAMUSCULAR; INTRAVENOUS at 11:53

## 2025-03-25 RX ADMIN — FENTANYL CITRATE 50 MCG: 50 INJECTION, SOLUTION INTRAMUSCULAR; INTRAVENOUS at 11:51

## 2025-03-25 RX ADMIN — PROPOFOL 150 MG: 10 INJECTION, EMULSION INTRAVENOUS at 11:48

## 2025-03-25 RX ADMIN — MIDAZOLAM HYDROCHLORIDE 2 MG: 2 INJECTION, SOLUTION INTRAMUSCULAR; INTRAVENOUS at 10:19

## 2025-03-25 RX ADMIN — METOCLOPRAMIDE 10 MG: 5 INJECTION, SOLUTION INTRAMUSCULAR; INTRAVENOUS at 17:58

## 2025-03-25 RX ADMIN — ROPIVACAINE HYDROCHLORIDE 15 ML: 5 INJECTION, SOLUTION EPIDURAL; INFILTRATION; PERINEURAL at 10:34

## 2025-03-25 RX ADMIN — FENTANYL CITRATE 50 MCG: 50 INJECTION, SOLUTION INTRAMUSCULAR; INTRAVENOUS at 11:48

## 2025-03-25 RX ADMIN — ROCURONIUM 20 MG: 50 INJECTION, SOLUTION INTRAVENOUS at 12:06

## 2025-03-25 RX ADMIN — ONDANSETRON 4 MG: 2 INJECTION INTRAMUSCULAR; INTRAVENOUS at 23:00

## 2025-03-25 ASSESSMENT — PAIN DESCRIPTION - DESCRIPTORS
DESCRIPTORS: SORE
DESCRIPTORS: SORE

## 2025-03-25 ASSESSMENT — COLUMBIA-SUICIDE SEVERITY RATING SCALE - C-SSRS
2. HAVE YOU ACTUALLY HAD ANY THOUGHTS OF KILLING YOURSELF?: NO
6. HAVE YOU EVER DONE ANYTHING, STARTED TO DO ANYTHING, OR PREPARED TO DO ANYTHING TO END YOUR LIFE?: NO
1. IN THE PAST MONTH, HAVE YOU WISHED YOU WERE DEAD OR WISHED YOU COULD GO TO SLEEP AND NOT WAKE UP?: NO

## 2025-03-25 ASSESSMENT — PAIN SCALES - GENERAL
PAINLEVEL_OUTOF10: 0 - NO PAIN
PAINLEVEL_OUTOF10: 6
PAINLEVEL_OUTOF10: 3
PAINLEVEL_OUTOF10: 5 - MODERATE PAIN
PAINLEVEL_OUTOF10: 0 - NO PAIN
PAINLEVEL_OUTOF10: 2
PAINLEVEL_OUTOF10: 4
PAINLEVEL_OUTOF10: 0 - NO PAIN
PAINLEVEL_OUTOF10: 3
PAINLEVEL_OUTOF10: 2

## 2025-03-25 ASSESSMENT — PAIN DESCRIPTION - LOCATION: LOCATION: ABDOMEN

## 2025-03-25 ASSESSMENT — PAIN - FUNCTIONAL ASSESSMENT
PAIN_FUNCTIONAL_ASSESSMENT: 0-10
PAIN_FUNCTIONAL_ASSESSMENT: 0-10

## 2025-03-25 NOTE — ANESTHESIA POSTPROCEDURE EVALUATION
"Patient: Clifford Mandujano \"Ariel\"    Procedure Summary       Date: 03/25/25 Room / Location: [facility] OR 06 / [facility] OR    Anesthesia Start: 1139 Anesthesia Stop: 1512    Procedure: Abdominal myomectomy Diagnosis:       Fibroids      (Fibroids [D21.9])    Surgeons: Ivy Sanchez MD Responsible Provider: Rafy Swanson MD    Anesthesia Type: general ASA Status: 2            Anesthesia Type: general    Vitals Value Taken Time   /66 03/25/25 1506   Temp 36 °C (96.8 °F) 03/25/25 1505   Pulse 75 03/25/25 1509   Resp 7 03/25/25 1509   SpO2 100 % 03/25/25 1509   Vitals shown include unfiled device data.    Anesthesia Post Evaluation    Patient location during evaluation: PACU  Patient participation: complete - patient participated  Level of consciousness: awake and alert  Pain management: adequate  Airway patency: patent  Cardiovascular status: acceptable, hemodynamically stable and blood pressure returned to baseline  Respiratory status: acceptable, spontaneous ventilation, unassisted and face mask  Hydration status: acceptable  Postoperative Nausea and Vomiting: none        There were no known notable events for this encounter.    "

## 2025-03-25 NOTE — PROCEDURES
Peripheral Block    Patient location during procedure: pre-op  Start time: 3/25/2025 10:20 AM  End time: 3/25/2025 10:39 AM  Reason for block: at surgeon's request and post-op pain management  Staffing  Performed: resident and attending   Authorized by: Chente Burks MD    Performed by: Chente Burks MD  Preanesthetic Checklist  Completed: patient identified, IV checked, site marked, risks and benefits discussed, surgical consent, monitors and equipment checked, pre-op evaluation and timeout performed   Timeout performed at: 3/25/2025 10:18 AM  Peripheral Block  Patient position: laying flat  Prep: ChloraPrep  Patient monitoring: heart rate and continuous pulse ox  Block type: QL  Laterality: B/L  Injection technique: catheter  Guidance: ultrasound guided  Local infiltration: lidocaine  Infiltration strength: 0.5 %  Dose: 30 mL  Needle  Needle type: Tuohy   Needle gauge: 22 G  Needle length: 8 cm  Needle localization: ultrasound guidance     image stored in chart  Catheter type: multiorifice  Assessment  Injection assessment: negative aspiration for heme, no paresthesia on injection, incremental injection and local visualized surrounding nerve on ultrasound  Additional Notes  Quadratus lumborum catheters:      Prior to procedure: Following a focused history, procedure-related and patient-specific complications were discussed. Risks, benefits, and alternatives were explained. Informed, written consent was provided by the patient and/or surrogate decision maker for the block. Anticoagulation (if any) was held per [facility] guidelines. ASA monitors were applied. Patient was positioned, prepped with chlorhexidine, and draped with sterile towels.     Ultrasound guidance was used to identify the quadratus lumborum and surrounding structures. The needle was visualized throughout the procedure. Aspiration was negative. A total of 30 cc of 0.5% ropivacaine, dexamethasone 4mg, and 1:200,000 epinephrine, was  divided and injected bilaterally. Catheters threaded into space and secured. Patient tolerated procedure well.     Timeout by PREOP RN

## 2025-03-25 NOTE — H&P
"Gynecologic Surgery History and Physical    Subjective   Clifford Mandujano \"Ariel\" is a 24 y.o. with multifibroid uterus presenting for abdominal myomectomy.    Pertinent PMH: asthma, fibroids  Pertient PSH: none  Imaging:   --TVUS:  uterus measures  8.8 cm x 9.6 cm x 11.3 cm. Multiple  heterogeneous, hypoechoic uterine lesions are identified with posterior shadowing most consistent with leiomyomas. Normal adnexa  --MRI: The uterus is anteverted and is markedly enlarged in size with  multilobulated appearance due to multiple enlarged well-circumscribed intramural fibroids. The largest fibroid measures 5.3 x 5.8 cm. Enlarged multilobulated leiomyomatous uterus with some demonstrating hemorrhagic degeneration. No evidence of endometriosis.    Obstetrical History   OB History    Para Term  AB Living   0 0 0 0 0 0   SAB IAB Ectopic Multiple Live Births   0 0 0 0 0       Past Medical History  Past Medical History:   Diagnosis Date    Allergic     Anemia     Iron Deficiency Anemia; venofer infusions.    Asthma     Chlamydial infection, unspecified 2020    Chlamydia    Dysfunctional uterine bleeding     Dysmenorrhea, unspecified 10/11/2018    Adolescent dysmenorrhea    Eczema     Encounter for other specified special examinations     Diagnostic skin test    Encounter for routine child health examination with abnormal findings 2018    Encounter for routine child health examination with abnormal findings    Fibroid 9/10/23    Fibroids     Headache     Hypermetropia, right eye 2019    Hypermetropia of right eye    Nosebleed     Other conditions influencing health status     Full-term infant    Other diseases of vocal cords     Vocal cord dysfunction    Palpitations     states she saw a cards provder when she was 11    Pelvic pain     Personal history of diseases of the skin and subcutaneous tissue 2018    History of acne    Personal history of other diseases of the nervous system and " "sense organs     History of migraine    Personal history of other endocrine, nutritional and metabolic disease 05/06/2015    History of vitamin D deficiency    Personal history of other infectious and parasitic diseases 08/07/2018    History of candidiasis of vagina    Personal history of other specified conditions 04/29/2020    History of epistaxis    Personal history of other specified conditions 03/07/2016    History of headache        Past Surgical History   Past Surgical History:   Procedure Laterality Date    NO PAST SURGERIES         Social History  Social History     Tobacco Use    Smoking status: Never    Smokeless tobacco: Never   Substance Use Topics    Alcohol use: Not Currently     Alcohol/week: 0.0 - 1.0 standard drinks of alcohol     Comment: social     Substance and Sexual Activity   Drug Use Never       Allergies  Cat dander, Dog dander, Grass pollen, Histamine, Histamine phosphate, Ragweed, and Weed pollen-short ragweed     Medications  No medications prior to admission.       ROS: negative except per HPI    Objective    Last Vitals  Vitals:    03/25/25 0940   BP: 126/83   Pulse: 95   Resp: 18   Temp: 36.1 °C (97 °F)   SpO2: 98%         Physical Examination  General: no acute distress  HEENT: normocephalic, atraumatic  Heart: warm and well perfused  Lungs: breathing comfortably on room air  Abdomen: deferred to OR  Extremities: moving all extremities  Neuro: awake and conversant  Psych: appropriate mood and affect    Lab Review          Lab Results   Component Value Date    WBC 4.5 02/27/2025    HGB 11.9 02/27/2025    HCT 37.5 02/27/2025    MCV 84.7 02/27/2025     02/27/2025       Lab Results   Component Value Date    GLUCOSE 82 11/19/2024    CALCIUM 9.5 11/19/2024     11/19/2024    K 4.4 11/19/2024    CO2 25 11/19/2024     11/19/2024    BUN 8 11/19/2024    CREATININE 0.86 11/19/2024       Assessment/Plan     Clifford Mandujano \"Tayjua\" is a 24 y.o. with pelvic pain and " multifibroid uterus  presenting for scheduled surgery.    Plan to proceed with abdominal myomectomy, any indicated procedure  Surgical consent was reviewed. The risks of surgery were discussed including: bleeding (including need for blood transfusion in life-threatening situations; risks of transfusion), infection, damage to surrounding organs. Both verbal and written consents were obtained.    Seen and discussed with Dr. Daniel Murillo MD, PGY-4   Gynecology [id number]

## 2025-03-25 NOTE — CONSULTS
Consults  Acute Pain Service  Clifford Mandujano is a 24 y.o. year old female patient who presents for EXCISION, MASS, UTERUS, ABDOMINAL APPROACH with Dr. Sanchez on 3/25. Acute Pain consulted for block for postoperative pain control.     Anticipated Postop Pain Issues -   Palliative: typically relieved with IV analgesics and regional local anesthetics  Provocative: typically with movement  Quality: typically burning and aching  Radiation: typically none  Severity: typically severe 8-10/10  Timing: typically constant    Past Medical History:   Diagnosis Date    Allergic     Anemia     Iron Deficiency Anemia; venofer infusions.    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/2019    Hypermetropia of right eye    Nosebleed     Other conditions influencing health status     Full-term infant    Other diseases of vocal cords     Vocal cord dysfunction    Palpitations     states she saw a cards provder when she was 11    Pelvic pain     Personal history of diseases of the skin and subcutaneous tissue 06/28/2018    History of acne    Personal history of other diseases of the nervous system and sense organs     History of migraine    Personal history of other endocrine, nutritional and metabolic disease 05/06/2015    History of vitamin D deficiency    Personal history of other infectious and parasitic diseases 08/07/2018    History of candidiasis of vagina    Personal history of other specified conditions 04/29/2020    History of epistaxis    Personal history of other specified  conditions 03/07/2016    History of headache        Past Surgical History:   Procedure Laterality Date    NO PAST SURGERIES          Family History   Problem Relation Name Age of Onset    Fibroids Mother Mom         had genetic testing for Cordero syndrome which was negative    Asthma Mother Mom     Diabetes Mother Mom     Hearing loss Mother Mom     Migraines Mother Mom     Eclampsia Mother Mom     Hypertension Mother Mom     Fibroids Maternal Grandmother Granny     Endometrial cancer Maternal Grandmother Granny     Stroke Maternal Grandmother Granny     Cancer Maternal Grandmother Granny     Diabetes Maternal Grandmother Granny     Hypertension Maternal Grandmother Sandra     Asthma Brother Ernesto Mandujano     Asthma Brother Naren Mandujano     Ovarian cancer Neg Hx      Colon cancer Neg Hx          Social History     Socioeconomic History    Marital status: Single     Spouse name: Not on file    Number of children: Not on file    Years of education: Not on file    Highest education level: Not on file   Occupational History    Not on file   Tobacco Use    Smoking status: Never    Smokeless tobacco: Never   Vaping Use    Vaping status: Never Used   Substance and Sexual Activity    Alcohol use: Not Currently     Alcohol/week: 0.0 - 1.0 standard drinks of alcohol     Comment: social    Drug use: Never    Sexual activity: Not Currently   Other Topics Concern    Not on file   Social History Narrative    Not on file     Social Drivers of Health     Financial Resource Strain: Not on file   Food Insecurity: Not on file   Transportation Needs: Not on file   Physical Activity: Not on file   Stress: Not on file   Social Connections: Not on file   Intimate Partner Violence: Not on file   Housing Stability: Not on file        Allergies   Allergen Reactions    Cat Dander Unknown    Dog Dander Unknown    Grass Pollen Unknown    Histamine Unknown    Histamine Phosphate Unknown    Ragweed Unknown    Weed Pollen-Short Ragweed Unknown          Review of Systems  Gen: No fatigue, anorexia, insomnia, fever.   Eyes: No vision loss, double vision, drainage, eye pain.   ENT: No pharyngitis, dry mouth, no hearing changes or ear discharge  Cardiac: No chest pain, palpitations, syncope, near syncope.   Pulmonary: No shortness of breath, cough, hemoptysis.   Heme/lymph: No swollen glands, fever, bleeding.   GI: No abdominal pain, change in bowel habits, melena, hematemesis, hematochezia, nausea, vomiting, diarrhea.   : No discharge, dysuria, frequency, urgency, hematuria.  Endo: No polyuria or weight loss.   Musculoskeletal: Negative for any pain or loss of ROM/weakness  Skin: No rashes or lesions  Neuro: Normal speech, no numbness or weakness. No gait difficulties  Review of systems is otherwise negative unless stated above or in history of present illness.    Physical Exam:  Constitutional:  no distress, alert and cooperative  Eyes: clear sclera  Head/Neck: No apparent injury, trachea midline  Respiratory/Thorax: Patent airways, thorax symmetric, breathing comfortably  Cardiovascular: no pitting edema  Gastrointestinal: Nondistended  Musculoskeletal: ROM intact  Extremities: no clubbing  Neurological: alert, pemberton x4  Psychological: Appropriate affect    Results for orders placed or performed during the hospital encounter of 03/25/25 (from the past 24 hours)   POCT pregnancy, urine   Result Value Ref Range    Preg Test, Ur Negative Negative      Clifford Mandujano is a 24 y.o. year old female patient who presents for EXCISION, MASS, UTERUS, ABDOMINAL APPROACH with Dr. Sanchez on 3/25. Acute Pain consulted for block for postoperative pain control.     Plan:  - b/l QL blocks with catheters performed preoperatively on 3/25  - Ambit ball with Ropivacaine 0.2%/NaCl 0.9% 500mL, Rate 7 cc/hr bilaterally  - Ambit medication will not interfere with pain medication prescribed by the primary team.   - Please be aware of local anesthetic toxic dose and absorption  variability before considering lidocaine patches  - Acute pain service will follow while catheters in place  - Rest of pain management per primary team    Acute Pain Resident  pg [phone] ph [phone]

## 2025-03-25 NOTE — DISCHARGE INSTRUCTIONS
Activity  No driving for 2 weeks.    No lifting over 10 lbs for 6 weeks  Use the railing for support when going up and down stairs.  Activity as tolerated  You may shower.   Do not take tub baths, go swimming or use a hot tub until instructed to do so  Pelvic Rest: You should not resume sexual activity or place anything inside your vagina at this time.  Your doctor will discuss recommendations at your follow-up appointment.  Notify Your Doctor or Nurse if you have any of the following  Wound Infection Symptoms  Call your doctor or nurse right away if you have signs of infection at your wound such as:  More pain around the wound  Change in the amount , color and odor of drainage  The skin around the wound feels warm or has red streaks  The wound separates or opens up  You have a temperature greater than 100.4  Deep Vein Thrombosis Symptoms  Call your doctor or nurse right away if you have any signs of blood clots such as:  Tender, swollen or reddened areas anywhere in your leg.  Numbness or tingling in your lower leg or calf, or at the top of your leg or groin  Skin on you leg looks pale or blue or feels cold to touch  Chest pain or have trouble breathing  Fever or chills  Fever or Chills  Call your doctor or nurse if you have a temperature greater than 100.4 degrees F that lasts more than 1 hour and/or chills.  Nausea and Vomiting     Call your doctor or nurse if you have nausea and vomiting that will not let you keep medicine down and will not let you keep fluids down  Vaginal Discharge or Bleeding  Call your doctor or nurse if foul smelling discharge form your vagina and heavy bleeding from your vagina that soaks 2 to 3 pads in 1 hour.  Unrelieved Pain  Call your doctor or nurse if your pain gets worse or is not eased 1 hour after taking your pain medicine.  Urinary Tract Infection Symptoms  Call your doctor or nurse right away if you have signs of a urinary tract infection such as:  Urine is cloudy, bloody or has  a bad odor  You leak urine or you have to urinate more often  You feel burning when you urinate  You have a temperature greater than 100.4  Incision Care  You do not need to cover your incision.  Clean it each day with mild soap and water.  Wash the incision daily and pat it dry. You may shower, but do not soak incision  until the wound is healed.  Do not apply any ointments, medications, creams or lotions to the wound.  The skin glue will fall off on its own    Follow up with Dr. Sanchez in 2 weeks.

## 2025-03-25 NOTE — OP NOTE
"Abdiminal myomectomy Operative Note     Date: 3/25/2025  OR Location: [facility] OR    Name: Clifford Mandujano \"Krish", : 2000, Age: 24 y.o., MRN: [id number], Sex: female    Diagnosis  Pre-op Diagnosis      * Fibroids [D21.9]  Pelvic pain Post-op Diagnosis     * Fibroids [D21.9]  Pelvic pain     Procedures  Abdiminal myomectomy  19950 - VT MYOMECTOMY 5/> MYOMAS &/>250 GM ABDOMINA      Surgeons      * Ivy Sanchez - Primary    Resident/Fellow/Other Assistant:  Surgeons and Role:     * Zahra Murillo MD - Resident - Assisting     * Pamela Casiano MD - Resident - Assisting    Staff:   Circulator: Jonathan  Scrub Person: Olga  Scrning Person: [place]  Relief Scrub: Jessica  Circulator: Ankita  Relief Circulator: Stefany  Circulator: [place]    Anesthesia Staff: Anesthesiologist: Rafy Swanson MD  C-AA: NEVAEH Garcia  Anesthesia Resident: Madeline Gomez MD    Procedure Summary  Anesthesia: Anesthesia type not filed in the log.  ASA: II  Estimated Blood Loss: 300 mL  Intra-op Medications:   Administrations occurring from 1051 to 1501 on 25:   Medication Name Total Dose   sodium chloride 0.9 % irrigation solution 1,000 mL   surgical lubricant gel 1 Application   miSOPROStoL (Cytotec) tablet 400 mcg   vasopressin (Vasostrict) 1 mL in sodium chloride 0.9% 100 mL syringe 30 mL   sterile water irrigation solution 3,000 mL   ceFAZolin (Ancef) vial 1 g 2 g   dexAMETHasone 10 mg/mL 10 mg   droperidol (Inapsine) injection 0.625 mg   esmolol 10 mg/mL 70 mg   fentaNYL (Sublimaze) injection 50 mcg/mL 100 mcg   HYDROmorphone (Dilaudid) injection 1 mg/mL 0.6 mg   ketorolac (Toradol) 30 mg 30 mg   LR bolus Cannot be calculated   lidocaine (Xylocaine) injection 2 % 70 mg   metoprolol 5 mg/5 mL 2 mg   midazolam PF (Versed) injection 1 mg/mL 2 mg   propofol (Diprivan) injection 10 mg/mL 150 mg   rocuronium (ZeMuron) 50 mg/5 mL injection 80 mg   tranexamic acid injection 100 mg/mL 1,000 mg              Anesthesia Record "               Intraprocedure I/O Totals          Intake    Tranexamic Acid 0.00 mL    The total shown is the total volume documented since Anesthesia Start was filed.    Total Intake 0 mL       Output    Urine 130 mL    Est. Blood Loss 300 mL    Total Output 430 mL       Net    Net Volume -430 mL          Specimen:   ID Type Source Tests Collected by Time   1 : FIBROIDS Tissue FIBROID MYOMECTOMY SURGICAL PATHOLOGY EXAM Ivy Sanchez MD 3/25/2025 1232          FINDINGS: On exam under anesthesia, a 18 week size uterus was palpated with no adnexal masses. Intraoperatively, a multifibroid uterus was visualized with normal right and left ovaries and Fallopian tubes. There were 25 fibroids removed. No entry into the uterine cavity was noted.    Due to the number and depth of the hysterotomies required, we would not recommend a trial of labor. We would recommend a scheduled  delivery in a future pregnancy.     COMPLICATIONS: None    INDICATIONS: Clifford Mandujano is a 24 y.o.  with multifibroid uterus and pelvic pain who desired surgical management. After discussing management options, the patient desired to proceed with surgery. The risks, benefits, and alternatives were  reviewed with the patient. All questions were answered and consents were signed.    DESCRIPTION OF PROCEDURE: The patient was brought to the operating room and a verification process confirmed the correct patient and procedure. General endotracheal anesthesia was adminstered. Exam under anesthesia revealed the findings noted above. The patient was prepped and draped in dorsal lithotomy position. A gonzáles catheter was placed in the urinary bladder. 400 mcg of misoprostol was placed rectally and 1 g tranexamic acid was given IV.    A Pfannenstiel incision was made 2 cm above the pubic symphysis. The skin was incised with a scalpel and the incision was taken down to the level of the fascia with monopolar energy. The fascia was incised and  extended bilaterally.  The fascia was tented up and dissected off the underlying rectus muscles. The rectus muscles were  in the midline and the peritoneum was tented up and entered sharply. The peritoneal incision was carried vertically with monopolar energy. The uterus was exteriorized.     Each fibroid was removed in a similar fashion: dilute vasopressin (20 units in 100 mL) was injected subserosally over the fibroid. An incision was made until the fibroid capsule was reached. The fibroid was grasped with a tenaculum and shelled out from the surrounding myometrium using blunt dissection and judicious use of monopolar energy. The defect was closed in multiple layers, the last incorporating the serosa. This was repeated until all remaining fibroids were removed. 25 fibroids were removed in total.    Once all fibroids were removed, the abdomen was copiously irrigated and excellent hemostasis was observed. All instruments were removed from the abdomen. Interceed was placed over the hysterotomy sites as an adhesion barrier. The peritoneum was closed with 2-0 Vicryl in a running fashion. The rectus muscles were inspected and excellent hemostasis was observed. The fascia was then closed with 0-Vicryl in a running fashion. The subcutaneous adipose tissue was closed with 2-0 vicryl. The skin was then reapproximately with 4-0 monocryl in a subcuticular fashion. Dermabond was then placed over the skin. The gonzáles catheter was removed. All sponge, lap, and needle counts were correct. The patient was extubated without complication. Patient tolerated the procedure well and was taken recovery room in stable condition.       Ivy Sanchez MD  Division of Minimally Invasive Gynecologic Surgery  [facility]

## 2025-03-25 NOTE — ANESTHESIA PROCEDURE NOTES
Airway  Date/Time: 3/25/2025 11:51 AM  Urgency: elective    Airway not difficult    Staffing  Performed: NEVAEH   Authorized by: Rafy Swanson MD    Performed by: NEVAEH Garcia  Patient location during procedure: OR    Indications and Patient Condition  Indications for airway management: anesthesia  Spontaneous Ventilation: absent  Sedation level: deep  Preoxygenated: yes  Patient position: sniffing  Mask difficulty assessment: 1 - vent by mask  Planned trial extubation    Final Airway Details  Final airway type: endotracheal airway      Successful airway: ETT  Cuffed: yes   Successful intubation technique: direct laryngoscopy  Facilitating devices/methods: intubating stylet  Endotracheal tube insertion site: oral  Blade: Elizabeth  Blade size: #3  ETT size (mm): 7.0  Cormack-Lehane Classification: grade I - full view of glottis  Placement verified by: capnometry   Measured from: lips  ETT to lips (cm): 22  Number of attempts at approach: 1

## 2025-03-25 NOTE — ANESTHESIA PREPROCEDURE EVALUATION
"Patient: Clifford Mandujano \"Ariel\"    Procedure Information       Date/Time: 03/25/25 1333    Procedure: EXCISION, MASS, UTERUS, ABDOMINAL APPROACH    Location: [facility] OR 06 / [facility] OR    Surgeons: Ivy Sanchez MD            Relevant Problems   Anesthesia (within normal limits)      Cardiac (within normal limits)      Pulmonary   (+) Intermittent asthma without complication (HHS-HCC)      /Renal (within normal limits)      Liver (within normal limits)      Endocrine (within normal limits)      Hematology   (+) Anemia of mother in pregnancy, delivered with postpartum condition (HHS-HCC)   (+) Iron deficiency anemia due to chronic blood loss      HEENT   (+) Seasonal allergies      ID   (+) Candidiasis of vagina   (+) Pityriasis versicolor      Skin   (+) Pityriasis versicolor   (+) Rash and other nonspecific skin eruption     The patient has had no previous anesthesia, and has no h/o anesthesia problems in the family.  Pt is NPO after midnight.    Clinical information reviewed:   Tobacco  Allergies  Meds   Med Hx  Surg Hx  OB Status  Fam Hx  Soc   Hx        NPO Detail:  NPO/Void Status  Date of Last Liquid: 03/25/25  Time of Last Liquid: 0000  Date of Last Solid: 03/25/25  Time of Last Solid: 0000         Physical Exam    Airway  Mallampati: II  TM distance: >3 FB  Neck ROM: full     Cardiovascular - normal exam  Rhythm: regular  Rate: normal     Dental - normal exam     Pulmonary - normal exam     Abdominal        Anesthesia Plan    History of general anesthesia?: no  History of complications of general anesthesia?: unknown/emergency    ASA 2     general     intravenous induction   Postoperative administration of opioids is intended.  Anesthetic plan and risks discussed with patient.  Use of blood products discussed with patient who consented to blood products.    "

## 2025-03-26 LAB
ANION GAP SERPL CALC-SCNC: 12 MMOL/L (ref 10–20)
BUN SERPL-MCNC: 12 MG/DL (ref 6–23)
CALCIUM SERPL-MCNC: 8.8 MG/DL (ref 8.6–10.6)
CHLORIDE SERPL-SCNC: 103 MMOL/L (ref 98–107)
CO2 SERPL-SCNC: 22 MMOL/L (ref 21–32)
CREAT SERPL-MCNC: 0.86 MG/DL (ref 0.5–1.05)
EGFRCR SERPLBLD CKD-EPI 2021: >90 ML/MIN/1.73M*2
ERYTHROCYTE [DISTWIDTH] IN BLOOD BY AUTOMATED COUNT: 20.5 % (ref 11.5–14.5)
GLUCOSE SERPL-MCNC: 127 MG/DL (ref 74–99)
HCT VFR BLD AUTO: 33.1 % (ref 36–46)
HGB BLD-MCNC: 10.7 G/DL (ref 12–16)
MAGNESIUM SERPL-MCNC: 1.88 MG/DL (ref 1.6–2.4)
MCH RBC QN AUTO: 27.6 PG (ref 26–34)
MCHC RBC AUTO-ENTMCNC: 32.3 G/DL (ref 32–36)
MCV RBC AUTO: 86 FL (ref 80–100)
NRBC BLD-RTO: 0 /100 WBCS (ref 0–0)
PLATELET # BLD AUTO: 240 X10*3/UL (ref 150–450)
POTASSIUM SERPL-SCNC: 4.2 MMOL/L (ref 3.5–5.3)
RBC # BLD AUTO: 3.87 X10*6/UL (ref 4–5.2)
SODIUM SERPL-SCNC: 133 MMOL/L (ref 136–145)
WBC # BLD AUTO: 11.1 X10*3/UL (ref 4.4–11.3)

## 2025-03-26 PROCEDURE — 80048 BASIC METABOLIC PNL TOTAL CA: CPT

## 2025-03-26 PROCEDURE — 2500000001 HC RX 250 WO HCPCS SELF ADMINISTERED DRUGS (ALT 637 FOR MEDICARE OP): Mod: SE | Performed by: STUDENT IN AN ORGANIZED HEALTH CARE EDUCATION/TRAINING PROGRAM

## 2025-03-26 PROCEDURE — 99024 POSTOP FOLLOW-UP VISIT: CPT | Performed by: STUDENT IN AN ORGANIZED HEALTH CARE EDUCATION/TRAINING PROGRAM

## 2025-03-26 PROCEDURE — 2500000001 HC RX 250 WO HCPCS SELF ADMINISTERED DRUGS (ALT 637 FOR MEDICARE OP): Mod: SE

## 2025-03-26 PROCEDURE — 1210000001 HC SEMI-PRIVATE ROOM DAILY

## 2025-03-26 PROCEDURE — 2500000004 HC RX 250 GENERAL PHARMACY W/ HCPCS (ALT 636 FOR OP/ED): Mod: SE | Performed by: STUDENT IN AN ORGANIZED HEALTH CARE EDUCATION/TRAINING PROGRAM

## 2025-03-26 PROCEDURE — 2500000004 HC RX 250 GENERAL PHARMACY W/ HCPCS (ALT 636 FOR OP/ED): Mod: SE

## 2025-03-26 PROCEDURE — 85027 COMPLETE CBC AUTOMATED: CPT

## 2025-03-26 PROCEDURE — RXMED WILLOW AMBULATORY MEDICATION CHARGE

## 2025-03-26 PROCEDURE — 83735 ASSAY OF MAGNESIUM: CPT

## 2025-03-26 PROCEDURE — 36415 COLL VENOUS BLD VENIPUNCTURE: CPT

## 2025-03-26 PROCEDURE — 99231 SBSQ HOSP IP/OBS SF/LOW 25: CPT

## 2025-03-26 RX ORDER — POLYETHYLENE GLYCOL 3350 17 G/17G
17 POWDER, FOR SOLUTION ORAL DAILY
Qty: 510 G | Refills: 0 | Status: SHIPPED | OUTPATIENT
Start: 2025-03-26

## 2025-03-26 RX ORDER — ACETAMINOPHEN 325 MG/1
975 TABLET ORAL EVERY 6 HOURS SCHEDULED
Status: DISCONTINUED | OUTPATIENT
Start: 2025-03-26 | End: 2025-03-27 | Stop reason: HOSPADM

## 2025-03-26 RX ORDER — DOCUSATE SODIUM 100 MG/1
100 CAPSULE, LIQUID FILLED ORAL 2 TIMES DAILY
Qty: 60 CAPSULE | Refills: 0 | Status: SHIPPED | OUTPATIENT
Start: 2025-03-26 | End: 2025-04-26

## 2025-03-26 RX ORDER — OXYCODONE HYDROCHLORIDE 5 MG/1
5 TABLET ORAL EVERY 6 HOURS PRN
Qty: 10 TABLET | Refills: 0 | Status: SHIPPED | OUTPATIENT
Start: 2025-03-26

## 2025-03-26 RX ORDER — CYCLOBENZAPRINE HCL 10 MG
10 TABLET ORAL ONCE
Status: COMPLETED | OUTPATIENT
Start: 2025-03-26 | End: 2025-03-26

## 2025-03-26 RX ORDER — IBUPROFEN 600 MG/1
600 TABLET ORAL EVERY 6 HOURS SCHEDULED
Status: DISCONTINUED | OUTPATIENT
Start: 2025-03-26 | End: 2025-03-27 | Stop reason: HOSPADM

## 2025-03-26 RX ORDER — IBUPROFEN 600 MG/1
600 TABLET ORAL EVERY 6 HOURS PRN
Qty: 50 TABLET | Refills: 0 | Status: SHIPPED | OUTPATIENT
Start: 2025-03-26

## 2025-03-26 RX ORDER — NALOXONE HYDROCHLORIDE 4 MG/.1ML
4 SPRAY NASAL AS NEEDED
Qty: 2 EACH | Refills: 0 | Status: SHIPPED | OUTPATIENT
Start: 2025-03-26

## 2025-03-26 RX ORDER — ACETAMINOPHEN 500 MG
1000 TABLET ORAL EVERY 6 HOURS PRN
Qty: 80 TABLET | Refills: 0 | Status: SHIPPED | OUTPATIENT
Start: 2025-03-26

## 2025-03-26 RX ADMIN — ACETAMINOPHEN 650 MG: 325 TABLET ORAL at 06:16

## 2025-03-26 RX ADMIN — POLYETHYLENE GLYCOL 3350 17 G: 17 POWDER, FOR SOLUTION ORAL at 08:43

## 2025-03-26 RX ADMIN — SIMETHICONE 80 MG: 80 TABLET, CHEWABLE ORAL at 16:31

## 2025-03-26 RX ADMIN — IBUPROFEN 600 MG: 600 TABLET, FILM COATED ORAL at 20:07

## 2025-03-26 RX ADMIN — IBUPROFEN 600 MG: 600 TABLET, FILM COATED ORAL at 07:28

## 2025-03-26 RX ADMIN — CETIRIZINE HYDROCHLORIDE 10 MG: 10 TABLET, FILM COATED ORAL at 08:43

## 2025-03-26 RX ADMIN — SODIUM CHLORIDE, POTASSIUM CHLORIDE, SODIUM LACTATE AND CALCIUM CHLORIDE 500 ML: 600; 310; 30; 20 INJECTION, SOLUTION INTRAVENOUS at 10:40

## 2025-03-26 RX ADMIN — OXYCODONE HYDROCHLORIDE 10 MG: 10 TABLET ORAL at 08:43

## 2025-03-26 RX ADMIN — DOCUSATE SODIUM 100 MG: 100 CAPSULE, LIQUID FILLED ORAL at 08:43

## 2025-03-26 RX ADMIN — DOCUSATE SODIUM 100 MG: 100 CAPSULE, LIQUID FILLED ORAL at 20:07

## 2025-03-26 RX ADMIN — ACETAMINOPHEN 975 MG: 325 TABLET ORAL at 20:07

## 2025-03-26 RX ADMIN — IBUPROFEN 600 MG: 600 TABLET, FILM COATED ORAL at 13:30

## 2025-03-26 RX ADMIN — ACETAMINOPHEN 975 MG: 325 TABLET ORAL at 13:30

## 2025-03-26 RX ADMIN — CYCLOBENZAPRINE 10 MG: 10 TABLET, FILM COATED ORAL at 10:39

## 2025-03-26 SDOH — ECONOMIC STABILITY: FOOD INSECURITY

## 2025-03-26 SDOH — ECONOMIC STABILITY: TRANSPORTATION INSECURITY: IN THE PAST 12 MONTHS, HAS LACK OF TRANSPORTATION KEPT YOU FROM MEDICAL APPOINTMENTS OR FROM GETTING MEDICATIONS?: NO

## 2025-03-26 SDOH — ECONOMIC STABILITY: INCOME INSECURITY: IN THE LAST 12 MONTHS, WAS THERE A TIME WHEN YOU WERE NOT ABLE TO PAY THE MORTGAGE OR RENT ON TIME?: NO

## 2025-03-26 SDOH — ECONOMIC STABILITY: GENERAL

## 2025-03-26 SDOH — ECONOMIC STABILITY: FOOD INSECURITY: WITHIN THE PAST 12 MONTHS, YOU WORRIED THAT YOUR FOOD WOULD RUN OUT BEFORE YOU GOT MONEY TO BUY MORE.: SOMETIMES TRUE

## 2025-03-26 SDOH — ECONOMIC STABILITY: FOOD INSECURITY: WITHIN THE PAST 12 MONTHS, THE FOOD YOU BOUGHT JUST DIDN'T LAST AND YOU DIDN'T HAVE MONEY TO GET MORE.: SOMETIMES TRUE

## 2025-03-26 SDOH — ECONOMIC STABILITY: HOUSING INSECURITY: IN THE PAST 12 MONTHS HAS THE ELECTRIC, GAS, OIL, OR WATER COMPANY THREATENED TO SHUT OFF SERVICES IN YOUR HOME?: NO

## 2025-03-26 SDOH — ECONOMIC STABILITY: HOUSING INSECURITY: IN THE LAST 12 MONTHS, HOW MANY PLACES HAVE YOU LIVED?: 2

## 2025-03-26 SDOH — ECONOMIC STABILITY: FOOD INSECURITY
WITHIN THE PAST 12 MONTHS, YOU WORRIED THAT YOUR FOOD WOULD RUN OUT BEFORE YOU GOT THE MONEY TO BUY MORE.: SOMETIMES TRUE

## 2025-03-26 SDOH — ECONOMIC STABILITY: TRANSPORTATION INSECURITY
IN THE PAST 12 MONTHS, HAS LACK OF TRANSPORTATION KEPT YOU FROM MEETINGS, WORK, OR FROM GETTING THINGS NEEDED FOR DAILY LIVING?: NO

## 2025-03-26 SDOH — ECONOMIC STABILITY: TRANSPORTATION INSECURITY

## 2025-03-26 SDOH — ECONOMIC STABILITY: HOUSING INSECURITY
IN THE LAST 12 MONTHS, WAS THERE A TIME WHEN YOU DID NOT HAVE A STEADY PLACE TO SLEEP OR SLEPT IN A SHELTER (INCLUDING NOW)?: YES

## 2025-03-26 SDOH — ECONOMIC STABILITY: HOUSING INSECURITY: IN THE LAST 12 MONTHS, WAS THERE A TIME WHEN YOU WERE NOT ABLE TO PAY THE MORTGAGE OR RENT ON TIME?: NO

## 2025-03-26 SDOH — ECONOMIC STABILITY: HOUSING INSECURITY

## 2025-03-26 SDOH — ECONOMIC STABILITY: TRANSPORTATION INSECURITY
IN THE PAST 12 MONTHS, HAS THE LACK OF TRANSPORTATION KEPT YOU FROM MEDICAL APPOINTMENTS OR FROM GETTING MEDICATIONS?: NO

## 2025-03-26 ASSESSMENT — ACTIVITIES OF DAILY LIVING (ADL)
FEEDING YOURSELF: INDEPENDENT
WALKS IN HOME: INDEPENDENT
JUDGMENT_ADEQUATE_SAFELY_COMPLETE_DAILY_ACTIVITIES: YES
ADEQUATE_TO_COMPLETE_ADL: YES
HEARING - RIGHT EAR: FUNCTIONAL
DRESSING YOURSELF: INDEPENDENT
BATHING: INDEPENDENT
PATIENT'S MEMORY ADEQUATE TO SAFELY COMPLETE DAILY ACTIVITIES?: YES
LACK_OF_TRANSPORTATION: NO
GROOMING: INDEPENDENT
HEARING - LEFT EAR: FUNCTIONAL
TOILETING: INDEPENDENT

## 2025-03-26 ASSESSMENT — PAIN DESCRIPTION - DESCRIPTORS
DESCRIPTORS: SORE
DESCRIPTORS: SORE
DESCRIPTORS: ACHING;DISCOMFORT
DESCRIPTORS: SORE

## 2025-03-26 ASSESSMENT — COGNITIVE AND FUNCTIONAL STATUS - GENERAL
DAILY ACTIVITIY SCORE: 24
MOBILITY SCORE: 24
PATIENT BASELINE BEDBOUND: NO

## 2025-03-26 ASSESSMENT — SOCIAL DETERMINANTS OF HEALTH (SDOH): IN THE PAST 12 MONTHS, HAS THE ELECTRIC, GAS, OIL, OR WATER COMPANY THREATENED TO SHUT OFF SERVICE IN YOUR HOME?: NO

## 2025-03-26 ASSESSMENT — PAIN SCALES - GENERAL
PAINLEVEL_OUTOF10: 1
PAINLEVEL_OUTOF10: 3
PAINLEVEL_OUTOF10: 0 - NO PAIN
PAINLEVEL_OUTOF10: 7
PAINLEVEL_OUTOF10: 2
PAINLEVEL_OUTOF10: 1
PAINLEVEL_OUTOF10: 2
PAINLEVEL_OUTOF10: 2

## 2025-03-26 NOTE — PROGRESS NOTES
Postop Pain HPI -   Palliative: relieved with IV analgesics and regional local anesthetics  Provocative: movement  Quality:  burning and aching  Radiation:  none  Severity:  5/10  Timing: constant    24-HOUR OPIOID CONSUMPTION:  Oxycodone 10 mg     Scheduled medications  cetirizine, 10 mg, oral, Daily  docusate sodium, 100 mg, oral, BID  polyethylene glycol, 17 g, oral, Daily      Continuous medications  ropivacaine (PF) in NS cmpd, 14 mL/hr, Last Rate: 14 mL/hr (03/25/25 1505)      PRN medications  PRN medications: acetaminophen, albuterol, HYDROmorphone, ibuprofen, metoclopramide **OR** metoclopramide, ondansetron **OR** ondansetron, oxyCODONE, oxyCODONE, promethazine, simethicone     Physical Exam:  Constitutional:  no distress, alert and cooperative  Eyes: clear sclera  Head/Neck: No apparent injury, trachea midline  Respiratory/Thorax: Patent airways, thorax symmetric, breathing comfortably  Cardiovascular: no pitting edema  Gastrointestinal: Nondistended  Musculoskeletal: ROM intact  Extremities: no clubbing  Neurological: alert, pemberton x4  Psychological: Appropriate affect    Results for orders placed or performed during the hospital encounter of 03/25/25 (from the past 24 hours)   POCT pregnancy, urine   Result Value Ref Range    Preg Test, Ur Negative Negative   Type And Screen   Result Value Ref Range    ABO TYPE O     Rh TYPE POS     ANTIBODY SCREEN NEG    Verify ABO/Rh Group Test (VERAB)   Result Value Ref Range    ABO TYPE O     Rh TYPE POS       Clifford Mandujano is a 24 y.o. year old female patient who presents for EXCISION, MASS, UTERUS, ABDOMINAL APPROACH with Dr. Sanchez on 3/25. Acute Pain consulted for block for postoperative pain control.      Plan:  - b/l QL blocks with catheters performed preoperatively on 3/25  - Ambit ball with Ropivacaine 0.2%/NaCl 0.9% 500mL, Rate 7 cc/hr bilaterally  - Ambit medication will not interfere with pain medication prescribed by the primary team.   - Ambit refill  today   - Please be aware of local anesthetic toxic dose and absorption variability before considering lidocaine patches  - Acute pain service will follow while catheters in place  - Rest of pain management per primary team     Acute Pain Resident  pg [phone] ph [phone]

## 2025-03-26 NOTE — SIGNIFICANT EVENT
"Clifford Mandujano \"Ariel\" is a 24 y.o. female on day 1 of admission presenting with Fibroids.    Subjective   Pain well controlled. Only reporting some soreness. Tolerating liquids but has not tried eating food. Denies nausea but had an episode of emesis earlier in the night. Denies CP and SOB. Ambulated to the restroom with assistance to void earlier in the evening.     Objective     Physical Exam  Cardiovascular:      Rate and Rhythm: Normal rate and regular rhythm.   Pulmonary:      Effort: Pulmonary effort is normal.      Breath sounds: Normal breath sounds.   Abdominal:      General: Abdomen is flat.      Palpations: Abdomen is soft.      Tenderness: There is no guarding or rebound.      Comments: Appropriate tenderness to palpation near incision, pfannenstiel incision dry and intact with skin glue     Musculoskeletal:         General: Normal range of motion.   Skin:     General: Skin is warm.   Neurological:      General: No focal deficit present.      Mental Status: She is alert.   Psychiatric:         Mood and Affect: Mood normal.       Last Recorded Vitals  Blood pressure 110/75, pulse 86, temperature 36.8 °C (98.2 °F), temperature source Temporal, resp. rate 16, height 1.575 m (5' 2\"), weight 71.5 kg (157 lb 10.1 oz), SpO2 99%.  Intake/Output last 3 Shifts:  I/O last 3 completed shifts:  In: 1061.8 (14.9 mL/kg) [P.O.:25; I.V.:10 (0.1 mL/kg); IV Piggyback:1000]  Out: 440 (6.2 mL/kg) [Urine:140 (0.1 mL/kg/hr); Blood:300]  Weight: 71.5 kg       Assessment/Plan   Clifford Mandujano \"Krish" is a 24 y.o. s/p abdominal myomectomy on 3/25 for multi fibroid uterus.     Postoperative Care   - Pain management per ERAS protocol, well controlled at this time   - Starting hgb 11.9 >  ml > POD#1 labs pending. Hemodynamically stable, abdominal exam benign   - Stable on room air, encourage incentive spirometry 10x/hr while awake  - Regular diet as tolerated, IVF LR at 40cc/hr until tolerating PO fluids. PRN " antiemetics for nausea   - Bowel regimen: daily Miralax   - Chavez removed in the OR, voiding spontaneously   - DVT ppx: SCDs, ambulation    Co morbidities   - Asthma: prn albuterol ordered     Dispo: until meeting all postop milestones     To be d/w Dr. Smart in the    Cirilo Garduno MD, PGY-3  Benign Gynecology, pager [phone]

## 2025-03-26 NOTE — PROGRESS NOTES
"Clifford Mandujano \"Ariel\" is a 24 y.o. female on day 1 of admission presenting with Fibroids.    Subjective   Resting in bed this AM. Has ambulated. Tolerating liquids, has not had an appetite for more. Voiding. No flatus.       Objective   Physical exam:  General:  AAOx3, No acute distress  Cardiovascular: Warm and well perfused  Respiratory: Normal respiratory effort   Abdominal:  Soft, appropriately tender to palpation; pfannenstiel incision covered with dermabond; well approximated; QL pain caths in place  Extremities: No edema, no calf tenderness   Skin: No rashes or lesions visualized     Last Recorded Vitals  Blood pressure 109/72, pulse 99, temperature 36.8 °C (98.2 °F), temperature source Temporal, resp. rate 16, height 1.575 m (5' 2\"), weight 71.5 kg (157 lb 10.1 oz), SpO2 97%.  Intake/Output last 3 Shifts:  I/O last 3 completed shifts:  In: 1061.8 (14.9 mL/kg) [P.O.:25; I.V.:10 (0.1 mL/kg); IV Piggyback:1000]  Out: 440 (6.2 mL/kg) [Urine:140 (0.1 mL/kg/hr); Blood:300]  Weight: 71.5 kg     Relevant Results  Lab Results   Component Value Date    WBC 11.1 03/26/2025    HGB 10.7 (L) 03/26/2025    HCT 33.1 (L) 03/26/2025     03/26/2025        Assessment/Plan   Assessment & Plan  Fibroids    Clifford Mandujano \"Ariel\" is a 24 y.o. s/p abdominal myomectomy on 3/25 for multi fibroid uterus.      Postoperative Care   - Pain management per ERAS protocol, well controlled at this time   - Starting hgb 11.9 >  ml > POD#1 labs pending. Hemodynamically stable, abdominal exam benign   - Stable on room air, encourage incentive spirometry 10x/hr while awake  - Regular diet as tolerated. PRN antiemetics for nausea   - Bowel regimen: daily Miralax   - Chavez removed in the OR, voiding spontaneously   - DVT ppx: SCDs, ambulation     Co morbidities   - Asthma: prn albuterol ordered      Dispo: until meeting all postop milestones     Discussed with Dr. Bing Murillo MD, PGY-4  Gynecology [phone]   "

## 2025-03-26 NOTE — CARE PLAN
The patient's goals for the shift include tolerate PO, walk    The clinical goals for the shift include continue to meet post-op milestones, ambulate x3    Patient remained free from falls/injury throughout shift. Patient continuing to meet Post-op milestones and have adequate pain control on PO medications. Patient ambulating independently and voiding freely. Still needs to pass flatus and was sitting up in chair majority of the shift. VSS, no s/sx of infection at this time. Patient resting comfortably in chair, denies having needs at this time.

## 2025-03-27 ENCOUNTER — PHARMACY VISIT (OUTPATIENT)
Dept: PHARMACY | Facility: CLINIC | Age: 25
End: 2025-03-27
Payer: MEDICAID

## 2025-03-27 VITALS
SYSTOLIC BLOOD PRESSURE: 112 MMHG | BODY MASS INDEX: 29.01 KG/M2 | HEART RATE: 110 BPM | HEIGHT: 62 IN | OXYGEN SATURATION: 99 % | RESPIRATION RATE: 16 BRPM | DIASTOLIC BLOOD PRESSURE: 70 MMHG | TEMPERATURE: 99 F | WEIGHT: 157.63 LBS

## 2025-03-27 LAB
ANION GAP SERPL CALC-SCNC: 11 MMOL/L (ref 10–20)
ANION GAP SERPL CALC-SCNC: 12 MMOL/L (ref 10–20)
BUN SERPL-MCNC: 11 MG/DL (ref 6–23)
BUN SERPL-MCNC: 12 MG/DL (ref 6–23)
CALCIUM SERPL-MCNC: 8.7 MG/DL (ref 8.6–10.6)
CALCIUM SERPL-MCNC: 8.9 MG/DL (ref 8.6–10.6)
CHLORIDE SERPL-SCNC: 101 MMOL/L (ref 98–107)
CHLORIDE SERPL-SCNC: 102 MMOL/L (ref 98–107)
CO2 SERPL-SCNC: 24 MMOL/L (ref 21–32)
CO2 SERPL-SCNC: 25 MMOL/L (ref 21–32)
CREAT SERPL-MCNC: 0.63 MG/DL (ref 0.5–1.05)
CREAT SERPL-MCNC: 0.7 MG/DL (ref 0.5–1.05)
EGFRCR SERPLBLD CKD-EPI 2021: >90 ML/MIN/1.73M*2
EGFRCR SERPLBLD CKD-EPI 2021: >90 ML/MIN/1.73M*2
ERYTHROCYTE [DISTWIDTH] IN BLOOD BY AUTOMATED COUNT: 20.4 % (ref 11.5–14.5)
ERYTHROCYTE [DISTWIDTH] IN BLOOD BY AUTOMATED COUNT: 20.4 % (ref 11.5–14.5)
GLUCOSE SERPL-MCNC: 101 MG/DL (ref 74–99)
GLUCOSE SERPL-MCNC: 95 MG/DL (ref 74–99)
HCT VFR BLD AUTO: 27.3 % (ref 36–46)
HCT VFR BLD AUTO: 27.5 % (ref 36–46)
HGB BLD-MCNC: 8.8 G/DL (ref 12–16)
HGB BLD-MCNC: 9 G/DL (ref 12–16)
MAGNESIUM SERPL-MCNC: 2.08 MG/DL (ref 1.6–2.4)
MCH RBC QN AUTO: 28.2 PG (ref 26–34)
MCH RBC QN AUTO: 28.8 PG (ref 26–34)
MCHC RBC AUTO-ENTMCNC: 32 G/DL (ref 32–36)
MCHC RBC AUTO-ENTMCNC: 33 G/DL (ref 32–36)
MCV RBC AUTO: 87 FL (ref 80–100)
MCV RBC AUTO: 88 FL (ref 80–100)
NRBC BLD-RTO: 0 /100 WBCS (ref 0–0)
NRBC BLD-RTO: 0 /100 WBCS (ref 0–0)
PLATELET # BLD AUTO: 210 X10*3/UL (ref 150–450)
PLATELET # BLD AUTO: 219 X10*3/UL (ref 150–450)
POTASSIUM SERPL-SCNC: 3.9 MMOL/L (ref 3.5–5.3)
POTASSIUM SERPL-SCNC: 4.1 MMOL/L (ref 3.5–5.3)
RBC # BLD AUTO: 3.12 X10*6/UL (ref 4–5.2)
RBC # BLD AUTO: 3.13 X10*6/UL (ref 4–5.2)
SODIUM SERPL-SCNC: 133 MMOL/L (ref 136–145)
SODIUM SERPL-SCNC: 134 MMOL/L (ref 136–145)
WBC # BLD AUTO: 10.1 X10*3/UL (ref 4.4–11.3)
WBC # BLD AUTO: 10.1 X10*3/UL (ref 4.4–11.3)

## 2025-03-27 PROCEDURE — 80048 BASIC METABOLIC PNL TOTAL CA: CPT

## 2025-03-27 PROCEDURE — 85027 COMPLETE CBC AUTOMATED: CPT

## 2025-03-27 PROCEDURE — 99024 POSTOP FOLLOW-UP VISIT: CPT

## 2025-03-27 PROCEDURE — 2500000004 HC RX 250 GENERAL PHARMACY W/ HCPCS (ALT 636 FOR OP/ED): Mod: SE

## 2025-03-27 PROCEDURE — 83735 ASSAY OF MAGNESIUM: CPT

## 2025-03-27 PROCEDURE — 36415 COLL VENOUS BLD VENIPUNCTURE: CPT

## 2025-03-27 PROCEDURE — 2500000005 HC RX 250 GENERAL PHARMACY W/O HCPCS: Mod: SE

## 2025-03-27 PROCEDURE — RXMED WILLOW AMBULATORY MEDICATION CHARGE

## 2025-03-27 PROCEDURE — 2500000001 HC RX 250 WO HCPCS SELF ADMINISTERED DRUGS (ALT 637 FOR MEDICARE OP): Mod: SE | Performed by: STUDENT IN AN ORGANIZED HEALTH CARE EDUCATION/TRAINING PROGRAM

## 2025-03-27 PROCEDURE — 2500000001 HC RX 250 WO HCPCS SELF ADMINISTERED DRUGS (ALT 637 FOR MEDICARE OP): Mod: SE

## 2025-03-27 RX ORDER — LIDOCAINE 560 MG/1
1 PATCH PERCUTANEOUS; TOPICAL; TRANSDERMAL DAILY
Qty: 15 PATCH | Refills: 2 | Status: SHIPPED | OUTPATIENT
Start: 2025-03-27

## 2025-03-27 RX ORDER — LIDOCAINE 560 MG/1
1 PATCH PERCUTANEOUS; TOPICAL; TRANSDERMAL DAILY
Status: DISCONTINUED | OUTPATIENT
Start: 2025-03-27 | End: 2025-03-27 | Stop reason: HOSPADM

## 2025-03-27 RX ADMIN — IBUPROFEN 600 MG: 600 TABLET, FILM COATED ORAL at 14:45

## 2025-03-27 RX ADMIN — ACETAMINOPHEN 975 MG: 325 TABLET ORAL at 02:09

## 2025-03-27 RX ADMIN — IBUPROFEN 600 MG: 600 TABLET, FILM COATED ORAL at 02:09

## 2025-03-27 RX ADMIN — CETIRIZINE HYDROCHLORIDE 10 MG: 10 TABLET, FILM COATED ORAL at 08:58

## 2025-03-27 RX ADMIN — DOCUSATE SODIUM 100 MG: 100 CAPSULE, LIQUID FILLED ORAL at 09:02

## 2025-03-27 RX ADMIN — ACETAMINOPHEN 975 MG: 325 TABLET ORAL at 08:58

## 2025-03-27 RX ADMIN — LIDOCAINE 1 PATCH: 4 PATCH TOPICAL at 12:00

## 2025-03-27 RX ADMIN — SIMETHICONE 80 MG: 80 TABLET, CHEWABLE ORAL at 02:09

## 2025-03-27 RX ADMIN — ACETAMINOPHEN 975 MG: 325 TABLET ORAL at 14:45

## 2025-03-27 RX ADMIN — POLYETHYLENE GLYCOL 3350 17 G: 17 POWDER, FOR SOLUTION ORAL at 08:59

## 2025-03-27 RX ADMIN — IBUPROFEN 600 MG: 600 TABLET, FILM COATED ORAL at 08:58

## 2025-03-27 SDOH — ECONOMIC STABILITY: HOUSING INSECURITY: IN THE PAST 12 MONTHS, HOW MANY TIMES HAVE YOU MOVED WHERE YOU WERE LIVING?: 1

## 2025-03-27 SDOH — ECONOMIC STABILITY: FOOD INSECURITY: WITHIN THE PAST 12 MONTHS, YOU WORRIED THAT YOUR FOOD WOULD RUN OUT BEFORE YOU GOT THE MONEY TO BUY MORE.: NEVER TRUE

## 2025-03-27 SDOH — SOCIAL STABILITY: SOCIAL INSECURITY: WERE YOU ABLE TO COMPLETE ALL THE BEHAVIORAL HEALTH SCREENINGS?: YES

## 2025-03-27 SDOH — SOCIAL STABILITY: SOCIAL INSECURITY: ABUSE: ADULT

## 2025-03-27 SDOH — ECONOMIC STABILITY: INCOME INSECURITY: IN THE PAST 12 MONTHS HAS THE ELECTRIC, GAS, OIL, OR WATER COMPANY THREATENED TO SHUT OFF SERVICES IN YOUR HOME?: NO

## 2025-03-27 SDOH — ECONOMIC STABILITY: HOUSING INSECURITY: DO YOU FEEL UNSAFE GOING BACK TO THE PLACE WHERE YOU LIVE?: NO

## 2025-03-27 SDOH — SOCIAL STABILITY: SOCIAL INSECURITY: WITHIN THE LAST YEAR, HAVE YOU BEEN AFRAID OF YOUR PARTNER OR EX-PARTNER?: NO

## 2025-03-27 SDOH — SOCIAL STABILITY: SOCIAL INSECURITY: ARE YOU OR HAVE YOU BEEN THREATENED OR ABUSED PHYSICALLY, EMOTIONALLY, OR SEXUALLY BY ANYONE?: NO

## 2025-03-27 SDOH — SOCIAL STABILITY: SOCIAL INSECURITY: HAVE YOU HAD ANY THOUGHTS OF HARMING ANYONE ELSE?: NO

## 2025-03-27 SDOH — ECONOMIC STABILITY: HOUSING INSECURITY: AT ANY TIME IN THE PAST 12 MONTHS, WERE YOU HOMELESS OR LIVING IN A SHELTER (INCLUDING NOW)?: NO

## 2025-03-27 SDOH — ECONOMIC STABILITY: FOOD INSECURITY: HOW HARD IS IT FOR YOU TO PAY FOR THE VERY BASICS LIKE FOOD, HOUSING, MEDICAL CARE, AND HEATING?: NOT HARD AT ALL

## 2025-03-27 SDOH — SOCIAL STABILITY: SOCIAL INSECURITY: WITHIN THE LAST YEAR, HAVE YOU BEEN HUMILIATED OR EMOTIONALLY ABUSED IN OTHER WAYS BY YOUR PARTNER OR EX-PARTNER?: NO

## 2025-03-27 SDOH — SOCIAL STABILITY: SOCIAL INSECURITY
WITHIN THE LAST YEAR, HAVE YOU BEEN RAPED OR FORCED TO HAVE ANY KIND OF SEXUAL ACTIVITY BY YOUR PARTNER OR EX-PARTNER?: NO

## 2025-03-27 SDOH — ECONOMIC STABILITY: FOOD INSECURITY: WITHIN THE PAST 12 MONTHS, THE FOOD YOU BOUGHT JUST DIDN'T LAST AND YOU DIDN'T HAVE MONEY TO GET MORE.: NEVER TRUE

## 2025-03-27 SDOH — ECONOMIC STABILITY: HOUSING INSECURITY: IN THE LAST 12 MONTHS, WAS THERE A TIME WHEN YOU WERE NOT ABLE TO PAY THE MORTGAGE OR RENT ON TIME?: YES

## 2025-03-27 SDOH — SOCIAL STABILITY: SOCIAL INSECURITY
WITHIN THE LAST YEAR, HAVE YOU BEEN KICKED, HIT, SLAPPED, OR OTHERWISE PHYSICALLY HURT BY YOUR PARTNER OR EX-PARTNER?: NO

## 2025-03-27 SDOH — SOCIAL STABILITY: SOCIAL INSECURITY: ARE THERE ANY APPARENT SIGNS OF INJURIES/BEHAVIORS THAT COULD BE RELATED TO ABUSE/NEGLECT?: NO

## 2025-03-27 SDOH — SOCIAL STABILITY: SOCIAL INSECURITY: DOES ANYONE TRY TO KEEP YOU FROM HAVING/CONTACTING OTHER FRIENDS OR DOING THINGS OUTSIDE YOUR HOME?: NO

## 2025-03-27 SDOH — SOCIAL STABILITY: SOCIAL INSECURITY: HAVE YOU HAD THOUGHTS OF HARMING ANYONE ELSE?: NO

## 2025-03-27 SDOH — SOCIAL STABILITY: SOCIAL INSECURITY: HAS ANYONE EVER THREATENED TO HURT YOUR FAMILY OR YOUR PETS?: NO

## 2025-03-27 SDOH — ECONOMIC STABILITY: TRANSPORTATION INSECURITY: IN THE PAST 12 MONTHS, HAS LACK OF TRANSPORTATION KEPT YOU FROM MEDICAL APPOINTMENTS OR FROM GETTING MEDICATIONS?: NO

## 2025-03-27 SDOH — SOCIAL STABILITY: SOCIAL INSECURITY: DO YOU FEEL UNSAFE GOING BACK TO THE PLACE WHERE YOU ARE LIVING?: NO

## 2025-03-27 SDOH — SOCIAL STABILITY: SOCIAL INSECURITY: DO YOU FEEL ANYONE HAS EXPLOITED OR TAKEN ADVANTAGE OF YOU FINANCIALLY OR OF YOUR PERSONAL PROPERTY?: NO

## 2025-03-27 ASSESSMENT — PAIN SCALES - GENERAL
PAINLEVEL_OUTOF10: 3
PAINLEVEL_OUTOF10: 3
PAINLEVEL_OUTOF10: 0 - NO PAIN
PAINLEVEL_OUTOF10: 3
PAINLEVEL_OUTOF10: 1
PAINLEVEL_OUTOF10: 0 - NO PAIN

## 2025-03-27 ASSESSMENT — LIFESTYLE VARIABLES
SKIP TO QUESTIONS 9-10: 1
AUDIT-C TOTAL SCORE: 1
HOW MANY STANDARD DRINKS CONTAINING ALCOHOL DO YOU HAVE ON A TYPICAL DAY: 1 OR 2
HOW OFTEN DO YOU HAVE 6 OR MORE DRINKS ON ONE OCCASION: NEVER
AUDIT-C TOTAL SCORE: 1
HOW OFTEN DO YOU HAVE A DRINK CONTAINING ALCOHOL: MONTHLY OR LESS

## 2025-03-27 ASSESSMENT — PATIENT HEALTH QUESTIONNAIRE - PHQ9
1. LITTLE INTEREST OR PLEASURE IN DOING THINGS: NOT AT ALL
SUM OF ALL RESPONSES TO PHQ9 QUESTIONS 1 & 2: 0
2. FEELING DOWN, DEPRESSED OR HOPELESS: NOT AT ALL

## 2025-03-27 ASSESSMENT — PAIN DESCRIPTION - LOCATION
LOCATION: ABDOMEN

## 2025-03-27 ASSESSMENT — ACTIVITIES OF DAILY LIVING (ADL): LACK_OF_TRANSPORTATION: NO

## 2025-03-27 ASSESSMENT — PAIN DESCRIPTION - ORIENTATION: ORIENTATION: RIGHT;MID

## 2025-03-27 NOTE — CARE PLAN
The patient's goals for the shift include go home    The clinical goals for the shift include continue to meet post-op milestones      Problem: Pain - Adult  Goal: Verbalizes/displays adequate comfort level or baseline comfort level  Outcome: Met     Problem: Safety - Adult  Goal: Free from fall injury  Outcome: Met     Problem: Discharge Planning  Goal: Discharge to home or other facility with appropriate resources  Outcome: Met       VS and assessments stable through discharge. Pt comfortable on current pain regimen, No s/sx of infection. RN reviewed homegoing instructions with patient. Pt denies any further questions and concerns.

## 2025-03-27 NOTE — DISCHARGE SUMMARY
Discharge Summary    Admission Date: 3/25/2025  Discharge Date: 3/27/25    Discharge Diagnosis  Fibroids    Hospital Course  Patient was admitted on 3/25 for scheduled surgery. She underwent an open myomectomy. Her procedure was uncomplicated, see the operative report for full details. By POD#2 she was meeting all postoperative milestones including: tolerating PO diet and medications, voiding, ambulating. Her pain was well controlled with PO pain medications. She was appropriate for discharge home and will follow up as an outpatient with Dr. Sanchez.      Pertinent Physical Exam At Time of Discharge  General: Well appearing, alert  HEENT: normocephalic, EOMI, clear sclera  Cardio: Warm and well perfused  Resp: breathing comfortably on room air  Abd: soft, appropriately tender, minimally distended. Pfannenstiel incision c/d/I   Neuro: grossly intact, no focal deficits  Extremities: full ROM, no calf tenderness  Psych: A&O x3, appropriate mood and affect      Last Vitals:  Temp Pulse Resp BP MAP Pulse Ox   36.5 °C (97.7 °F) 101 16 108/73 84 98 %     Discharge Meds     Your medication list        START taking these medications        Instructions Last Dose Given Next Dose Due   acetaminophen 500 mg tablet  Commonly known as: Tylenol Extra Strength      Take 2 tablets (1,000 mg) by mouth every 6 hours if needed for mild pain (1 - 3).       ibuprofen 600 mg tablet      Take 1 tablet (600 mg) by mouth every 6 hours if needed for mild pain (1 - 3).       naloxone 4 mg/0.1 mL nasal spray  Commonly known as: Narcan      Administer 1 spray (4 mg) into affected nostril(s) if needed for opioid reversal or respiratory depression. May repeat every 2-3 minutes if needed, alternating nostrils, until medical assistance becomes available.       oxyCODONE 5 mg immediate release tablet  Commonly known as: Roxicodone      Take 1 tablet (5 mg) by mouth every 6 hours if needed for moderate pain (4 - 6) or severe pain (7 - 10).        polyethylene glycol 17 gram/dose powder  Commonly known as: Glycolax, Miralax      Mix 17 g of powder (1 capful dissovled in 8 ounces of water)  and drink once daily.              CHANGE how you take these medications        Instructions Last Dose Given Next Dose Due   cetirizine 10 mg tablet  Commonly known as: ZyrTEC  What changed: when to take this      Take 1 tablet (10 mg) by mouth once daily.       docusate sodium 100 mg capsule  Commonly known as: Colace  What changed:   when to take this  reasons to take this      Take 1 capsule (100 mg) by mouth 2 times a day.              CONTINUE taking these medications        Instructions Last Dose Given Next Dose Due   albuterol 90 mcg/actuation inhaler  Commonly known as: Ventolin HFA      Inhale 2 puffs every 4 hours if needed for wheezing or shortness of breath.       fluticasone 50 mcg/actuation nasal spray  Commonly known as: Flonase      Administer 1 spray into each nostril once daily. Shake gently. Before first use, prime pump. After use, clean tip and replace cap.       norethindrone 5 mg tablet  Commonly known as: Aygestin      Take 2 tablets (10 mg) by mouth once daily.              STOP taking these medications      naproxen 500 mg tablet  Commonly known as: Naprosyn               ASK your doctor about these medications        Instructions Last Dose Given Next Dose Due   mupirocin 2 % ointment  Commonly known as: Bactroban      Apply a pea sized amount to the pad of the thumb, swipe into the nostril, sniff, then pinch the nose 2-3 times daily. Do NOT use a Q-tip.       Nasal Decongestant (oxymetazl) 0.05 % nasal spray  Generic drug: oxymetazoline      Administer 2 sprays into each nostril every 12 hours if needed for congestion for up to 2 days. Do not use for more than 3 days.                 Where to Get Your Medications        [facility] [id number]      Hours: 8:30 AM to 5 PM  Mon-Fri Phone: [phone]   acetaminophen 500 mg tablet  docusate sodium 100 mg capsule  ibuprofen 600 mg tablet  naloxone 4 mg/0.1 mL nasal spray  oxyCODONE 5 mg immediate release tablet  polyethylene glycol 17 gram/dose powder          Complications Requiring Follow-Up  none    Test Results Pending At Discharge  Pending Labs       Order Current Status    CBC Collected (03/27/25 0613)    Basic Metabolic Panel In process    Magnesium In process    Surgical Pathology Exam In process            Outpatient Follow-Up  Future Appointments   Date Time Provider Department Center   3/28/2025  8:30 AM Iftikhar Zamora DO [id number] None   4/21/2025  8:15 AM Ivy Sanchez MD [id number] Academic           Gerda Cervantes MD

## 2025-03-27 NOTE — CARE PLAN
The patient's goals for the shift include getting some rest    The clinical goals for the shift include pt will be able to pass gas by end of shift    Over the shift, the patient was able to ambulate the halls more and pass flatus towards end of shift. Pt was comfortable in chair, sleeping for most of shift. VSS. No s/sx of infection present. Pt free from N/V overnight.

## 2025-03-27 NOTE — PROGRESS NOTES
Postop Pain HPI -   Palliative: relieved with IV analgesics and regional local anesthetics  Provocative: movement  Quality:  burning and aching  Radiation:  none  Severity:  1-2/10  Timing: constant    24-HOUR OPIOID CONSUMPTION:  Oxycodone 10 mg     Scheduled medications  acetaminophen, 975 mg, oral, q6h ROSALIA  cetirizine, 10 mg, oral, Daily  docusate sodium, 100 mg, oral, BID  ibuprofen, 600 mg, oral, q6h ROSALIA  lidocaine, 1 patch, transdermal, Daily  polyethylene glycol, 17 g, oral, Daily      Continuous medications  ropivacaine (PF) in NS cmpd, 14 mL/hr, Last Rate: 14 mL/hr (03/25/25 1505)      PRN medications  PRN medications: albuterol, HYDROmorphone, metoclopramide **OR** metoclopramide, ondansetron **OR** ondansetron, oxyCODONE, oxyCODONE, promethazine, simethicone     Physical Exam:  Constitutional:  no distress, alert and cooperative  Eyes: clear sclera  Head/Neck: No apparent injury, trachea midline  Respiratory/Thorax: Patent airways, thorax symmetric, breathing comfortably  Cardiovascular: no pitting edema  Gastrointestinal: Nondistended  Musculoskeletal: ROM intact  Extremities: no clubbing  Neurological: alert, pemberton x4  Psychological: Appropriate affect    Results for orders placed or performed during the hospital encounter of 03/25/25 (from the past 24 hours)   CBC   Result Value Ref Range    WBC 10.1 4.4 - 11.3 x10*3/uL    nRBC 0.0 0.0 - 0.0 /100 WBCs    RBC 3.12 (L) 4.00 - 5.20 x10*6/uL    Hemoglobin 8.8 (L) 12.0 - 16.0 g/dL    Hematocrit 27.5 (L) 36.0 - 46.0 %    MCV 88 80 - 100 fL    MCH 28.2 26.0 - 34.0 pg    MCHC 32.0 32.0 - 36.0 g/dL    RDW 20.4 (H) 11.5 - 14.5 %    Platelets 210 150 - 450 x10*3/uL   Basic Metabolic Panel   Result Value Ref Range    Glucose 101 (H) 74 - 99 mg/dL    Sodium 133 (L) 136 - 145 mmol/L    Potassium 3.9 3.5 - 5.3 mmol/L    Chloride 102 98 - 107 mmol/L    Bicarbonate 24 21 - 32 mmol/L    Anion Gap 11 10 - 20 mmol/L    Urea Nitrogen 12 6 - 23 mg/dL    Creatinine 0.70 0.50 -  1.05 mg/dL    eGFR >90 >60 mL/min/1.73m*2    Calcium 8.9 8.6 - 10.6 mg/dL   Magnesium   Result Value Ref Range    Magnesium 2.08 1.60 - 2.40 mg/dL      Clifford Mandujano is a 24 y.o. year old female patient who presents for EXCISION, MASS, UTERUS, ABDOMINAL APPROACH with Dr. Sanchez on 3/25. Acute Pain consulted for block for postoperative pain control.      Plan:  - b/l QL blocks with catheters performed preoperatively on 3/25  - pt received ropivacaine 5 ml bolus b/l, catheters removed.  - Acute pain service will sign off.       Acute Pain Resident  pg [phone] ph [phone]

## 2025-03-28 ENCOUNTER — TELEMEDICINE (OUTPATIENT)
Facility: HOSPITAL | Age: 25
End: 2025-03-28
Payer: COMMERCIAL

## 2025-03-28 DIAGNOSIS — Z13.41 ENCOUNTER FOR AUTISM SCREENING: Primary | ICD-10-CM

## 2025-03-28 PROCEDURE — 99212 OFFICE O/P EST SF 10 MIN: CPT | Performed by: STUDENT IN AN ORGANIZED HEALTH CARE EDUCATION/TRAINING PROGRAM

## 2025-03-28 NOTE — PROGRESS NOTES
"  Subjective   Patient ID: Gloryyolman Mandujano \"Ariel\" is a 24 y.o. female who presents for Follow-up. Virtual visit, appt time 1:40, joined call 1:30.    Interval changes:  12/20/24 Last PCP ([facility]) visit: concern for autism, referred to Neuropsych. I do not see a note from them.  3/27/25 Discharged from [facility] for scheduled open myomectomy 3/25/25. Tolerated well, no complications.  1/28/25 ENT for epistaxis. Doxy for ABRS and Afrin for epistaxis.  2/10/25 also follows with Dr. Lombardi ([facility]) for concussion.    1. Autism spectrum  ADHD family history  Started college age 17  Friends since adolescence have expressed concerns that she does not understand social cues  Brother agrees  Parents are not emphatic one way or another  She agrees that she does not really understand many social situations, but it is not hurting relationships at this point             Review of Systems   All other systems reviewed and are negative.      Objective   There were no vitals taken for this visit.    Physical Exam  Vitals reviewed: virtual visit. pleasant conversation. NAD.         Assessment/Plan   Problem List Items Addressed This Visit    None  Visit Diagnoses         Codes    Encounter for autism screening    -  Primary Z13.41    Relevant Orders    Referral to Adult Neuropsychology             Note: This documentaion was entered into the electronic medical record using [employer] medical dictation software, and was not necessarily edited thereafter. Please excuse any errors of spelling or grammar.    "

## 2025-04-06 NOTE — PROGRESS NOTES
Division of Minimally Invasive Gynecologic Surgery  [facility]  CC: post-op visit    Subjective     History of Present Illness:   Ms. Mandujano is a 24 y.o. who presents for post-op visit.     Date: 3/25/25  Surgery: abdominal myomectomy  Findings: On exam under anesthesia, a 18 week size uterus was palpated with no adnexal masses. Intraoperatively, a multifibroid uterus was visualized with normal right and left ovaries and Fallopian tubes. There were 25 fibroids removed. No entry into the uterine cavity was noted.     Pathology:   pending      Since her surgery, she is doing well. She denies fevers, chills, incisional complaints.        Past Medical History:   Diagnosis Date    Allergic     Anemia     Iron Deficiency Anemia; venofer infusions.    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/2019    Hypermetropia of right eye    Nosebleed     Other conditions influencing health status     Full-term infant    Other diseases of vocal cords     Vocal cord dysfunction    Palpitations     states she saw a cards provder when she was 11    Pelvic pain     Personal history of diseases of the skin and subcutaneous tissue 06/28/2018    History of acne    Personal history of other diseases of the nervous system and sense organs     History of migraine    Personal history of other endocrine, nutritional and metabolic disease 05/06/2015    History of vitamin D deficiency    Personal history of other infectious and parasitic diseases 08/07/2018    History  of candidiasis of vagina    Personal history of other specified conditions 04/29/2020    History of epistaxis    Personal history of other specified conditions 03/07/2016    History of headache       Past Surgical History:   Procedure Laterality Date    NO PAST SURGERIES         Social History     Socioeconomic History    Marital status: Single     Spouse name: Not on file    Number of children: Not on file    Years of education: Not on file    Highest education level: Not on file   Occupational History    Not on file   Tobacco Use    Smoking status: Never    Smokeless tobacco: Never   Vaping Use    Vaping status: Never Used   Substance and Sexual Activity    Alcohol use: Not Currently     Alcohol/week: 0.0 - 1.0 standard drinks of alcohol     Comment: social    Drug use: Never    Sexual activity: Not Currently   Other Topics Concern    Not on file   Social History Narrative    Not on file     Social Drivers of Health     Financial Resource Strain: Low Risk  (3/27/2025)    Overall Financial Resource Strain (CARDIA)     Difficulty of Paying Living Expenses: Not hard at all   Food Insecurity: No Food Insecurity (3/27/2025)    Hunger Vital Sign     Worried About Running Out of Food in the Last Year: Never true     Ran Out of Food in the Last Year: Never true   Transportation Needs: No Transportation Needs (3/27/2025)    PRAPARE - Transportation     Lack of Transportation (Medical): No     Lack of Transportation (Non-Medical): No   Physical Activity: Not on file   Stress: Not on file   Social Connections: Not on file   Intimate Partner Violence: Not At Risk (3/27/2025)    Humiliation, Afraid, Rape, and Kick questionnaire     Fear of Current or Ex-Partner: No     Emotionally Abused: No     Physically Abused: No     Sexually Abused: No   Housing Stability: High Risk (3/27/2025)    Housing Stability Vital Sign     Unable to Pay for Housing in the Last Year: Yes     Number of Times Moved in the Last Year: 1     Homeless in  the Last Year: No         Current Outpatient Medications:     acetaminophen (Tylenol Extra Strength) 500 mg tablet, Take 2 tablets (1,000 mg) by mouth every 6 hours if needed for mild pain (1 - 3)., Disp: 80 tablet, Rfl: 0    albuterol (Ventolin HFA) 90 mcg/actuation inhaler, Inhale 2 puffs every 4 hours if needed for wheezing or shortness of breath., Disp: 8.5 g, Rfl: 5    fluticasone (Flonase) 50 mcg/actuation nasal spray, Administer 1 spray into each nostril once daily. Shake gently. Before first use, prime pump. After use, clean tip and replace cap., Disp: 16 g, Rfl: 5    ibuprofen 600 mg tablet, Take 1 tablet (600 mg) by mouth every 6 hours if needed for mild pain (1 - 3)., Disp: 50 tablet, Rfl: 0    lidocaine 4 % patch, Place 1 patch over 12 hours on the skin once daily. Remove & discard patch within 12 hours or as directed by MD. (12 hours on and 12 horus off), Disp: 15 patch, Rfl: 2    naloxone (Narcan) 4 mg/0.1 mL nasal spray, Administer 1 spray (4 mg) into affected nostril(s) if needed for opioid reversal or respiratory depression. May repeat every 2-3 minutes if needed, alternating nostrils, until medical assistance becomes available., Disp: 2 each, Rfl: 0    norethindrone (Aygestin) 5 mg tablet, Take 2 tablets (10 mg) by mouth once daily., Disp: 90 tablet, Rfl: 3    oxyCODONE (Roxicodone) 5 mg immediate release tablet, Take 1 tablet (5 mg) by mouth every 6 hours if needed for moderate pain (4 - 6) or severe pain (7 - 10)., Disp: 10 tablet, Rfl: 0    polyethylene glycol (Glycolax, Miralax) 17 gram/dose powder, Mix 17 g of powder (1 capful dissolved in 8 ounces of water)  and drink once daily., Disp: 510 g, Rfl: 0    cetirizine (ZyrTEC) 10 mg tablet, Take 1 tablet (10 mg) by mouth once daily., Disp: 30 tablet, Rfl: 11    docusate sodium (Colace) 100 mg capsule, Take 1 capsule (100 mg) by mouth 2 times a day., Disp: 60 capsule, Rfl: 0    mupirocin (Bactroban) 2 % ointment, Apply a pea sized amount to the  pad of the thumb, swipe into the nostril, sniff, then pinch the nose 2-3 times daily. Do NOT use a Q-tip. (Patient not taking: Reported on 3/7/2025), Disp: 22 g, Rfl: 0    oxymetazoline (Afrin) 0.05 % nasal spray, Administer 2 sprays into each nostril every 12 hours if needed for congestion for up to 2 days. Do not use for more than 3 days. (Patient not taking: Reported on 3/7/2025), Disp: 30 mL, Rfl: 0    Allergies   Allergen Reactions    Cat Dander Unknown    Dog Dander Unknown    Grass Pollen Unknown    Histamine Unknown    Histamine Phosphate Unknown    Ragweed Unknown    Weed Pollen-Short Ragweed Unknown       Review of Systems    Objective     Vitals:    04/07/25 1049   BP: 116/77   Pulse: 98       Physical Exam:   General: Well-developed, well-nourished, alert and cooperative female who is oriented ×4 with intact memory.  She appears to be in no acute distress.  Lungs: Normal work of breathing.  Chest symmetrical with good excursion and no accessory muscles in use.  Abdomen soft and nontender.  Incisions healing well.   Extremities:  Regular full range of motion.  No clubbing cyanosis or edema  Skin: Warm and dry.  Intact without rashes, lesions or ulcers.  Gynecologic: deferred      Assessment/Plan   Clifford Mandujano is a 24 y.o. female s/p open myomectomy on 3/25/25 who presents for post-op visit     Path discussed  Routine postop precautions  F/u in 4-5 weeks for final pov  Patient pleased with today's visit     Ivy Sanchez MD  Division of Minimally Invasive Gynecologic Surgery

## 2025-04-07 ENCOUNTER — OFFICE VISIT (OUTPATIENT)
Dept: OBSTETRICS AND GYNECOLOGY | Facility: CLINIC | Age: 25
End: 2025-04-07
Payer: COMMERCIAL

## 2025-04-07 ENCOUNTER — PHARMACY VISIT (OUTPATIENT)
Dept: PHARMACY | Facility: CLINIC | Age: 25
End: 2025-04-07
Payer: MEDICAID

## 2025-04-07 VITALS
HEART RATE: 98 BPM | SYSTOLIC BLOOD PRESSURE: 116 MMHG | BODY MASS INDEX: 28.19 KG/M2 | WEIGHT: 154.1 LBS | DIASTOLIC BLOOD PRESSURE: 77 MMHG

## 2025-04-07 DIAGNOSIS — J30.2 SEASONAL ALLERGIES: ICD-10-CM

## 2025-04-07 DIAGNOSIS — T78.40XS ALLERGY, SEQUELA: Primary | ICD-10-CM

## 2025-04-07 PROCEDURE — RXMED WILLOW AMBULATORY MEDICATION CHARGE

## 2025-04-07 PROCEDURE — 99211 OFF/OP EST MAY X REQ PHY/QHP: CPT | Performed by: STUDENT IN AN ORGANIZED HEALTH CARE EDUCATION/TRAINING PROGRAM

## 2025-04-07 RX ORDER — CETIRIZINE HYDROCHLORIDE 10 MG/1
10 TABLET ORAL DAILY
Qty: 30 TABLET | Refills: 11 | Status: SHIPPED | OUTPATIENT
Start: 2025-04-07 | End: 2026-04-07

## 2025-04-07 RX ORDER — CETIRIZINE HYDROCHLORIDE 10 MG/1
10 TABLET ORAL DAILY
Qty: 30 TABLET | Refills: 2 | Status: CANCELLED | OUTPATIENT
Start: 2025-04-07 | End: 2025-07-06

## 2025-04-07 ASSESSMENT — ENCOUNTER SYMPTOMS
CARDIOVASCULAR NEGATIVE: 0
CONSTITUTIONAL NEGATIVE: 0
RESPIRATORY NEGATIVE: 0
MUSCULOSKELETAL NEGATIVE: 0
ALLERGIC/IMMUNOLOGIC NEGATIVE: 0
GASTROINTESTINAL NEGATIVE: 0
HEMATOLOGIC/LYMPHATIC NEGATIVE: 0
ENDOCRINE NEGATIVE: 0
NEUROLOGICAL NEGATIVE: 0
EYES NEGATIVE: 0
PSYCHIATRIC NEGATIVE: 0

## 2025-04-07 ASSESSMENT — PAIN SCALES - GENERAL: PAINLEVEL_OUTOF10: 4

## 2025-04-08 ENCOUNTER — PHARMACY VISIT (OUTPATIENT)
Dept: PHARMACY | Facility: CLINIC | Age: 25
End: 2025-04-08
Payer: MEDICAID

## 2025-04-08 LAB
LABORATORY COMMENT REPORT: NORMAL
PATH REPORT.FINAL DX SPEC: NORMAL
PATH REPORT.GROSS SPEC: NORMAL
PATH REPORT.RELEVANT HX SPEC: NORMAL
PATH REPORT.TOTAL CANCER: NORMAL

## 2025-04-21 ENCOUNTER — APPOINTMENT (OUTPATIENT)
Dept: OBSTETRICS AND GYNECOLOGY | Facility: CLINIC | Age: 25
End: 2025-04-21
Payer: COMMERCIAL

## 2025-05-04 PROCEDURE — RXMED WILLOW AMBULATORY MEDICATION CHARGE

## 2025-05-05 ENCOUNTER — PHARMACY VISIT (OUTPATIENT)
Dept: PHARMACY | Facility: CLINIC | Age: 25
End: 2025-05-05
Payer: MEDICAID

## 2025-05-12 ENCOUNTER — PHARMACY VISIT (OUTPATIENT)
Dept: PHARMACY | Facility: CLINIC | Age: 25
End: 2025-05-12
Payer: MEDICAID

## 2025-05-12 ENCOUNTER — APPOINTMENT (OUTPATIENT)
Dept: OBSTETRICS AND GYNECOLOGY | Facility: CLINIC | Age: 25
End: 2025-05-12
Payer: COMMERCIAL

## 2025-05-12 VITALS
SYSTOLIC BLOOD PRESSURE: 124 MMHG | BODY MASS INDEX: 28.66 KG/M2 | HEART RATE: 101 BPM | DIASTOLIC BLOOD PRESSURE: 82 MMHG | WEIGHT: 156.7 LBS

## 2025-05-12 DIAGNOSIS — D25.0 INTRAMURAL AND SUBMUCOUS LEIOMYOMA OF UTERUS: Primary | ICD-10-CM

## 2025-05-12 DIAGNOSIS — D25.1 INTRAMURAL AND SUBMUCOUS LEIOMYOMA OF UTERUS: Primary | ICD-10-CM

## 2025-05-12 DIAGNOSIS — Z11.3 ROUTINE SCREENING FOR STI (SEXUALLY TRANSMITTED INFECTION): ICD-10-CM

## 2025-05-12 DIAGNOSIS — D62 ACUTE ON CHRONIC BLOOD LOSS ANEMIA: ICD-10-CM

## 2025-05-12 DIAGNOSIS — Z09 POSTOPERATIVE EXAMINATION: ICD-10-CM

## 2025-05-12 PROCEDURE — 99211 OFF/OP EST MAY X REQ PHY/QHP: CPT | Performed by: OBSTETRICS & GYNECOLOGY

## 2025-05-12 PROCEDURE — RXMED WILLOW AMBULATORY MEDICATION CHARGE

## 2025-05-12 PROCEDURE — 1036F TOBACCO NON-USER: CPT | Performed by: OBSTETRICS & GYNECOLOGY

## 2025-05-12 ASSESSMENT — ENCOUNTER SYMPTOMS
CONSTITUTIONAL NEGATIVE: 0
EYES NEGATIVE: 0
MUSCULOSKELETAL NEGATIVE: 0
PSYCHIATRIC NEGATIVE: 0
GASTROINTESTINAL NEGATIVE: 0
RESPIRATORY NEGATIVE: 0
ALLERGIC/IMMUNOLOGIC NEGATIVE: 0
ENDOCRINE NEGATIVE: 0
NEUROLOGICAL NEGATIVE: 0
HEMATOLOGIC/LYMPHATIC NEGATIVE: 0
CARDIOVASCULAR NEGATIVE: 0

## 2025-05-12 ASSESSMENT — PATIENT HEALTH QUESTIONNAIRE - PHQ9
SUM OF ALL RESPONSES TO PHQ9 QUESTIONS 1 AND 2: 0
1. LITTLE INTEREST OR PLEASURE IN DOING THINGS: NOT AT ALL
2. FEELING DOWN, DEPRESSED OR HOPELESS: NOT AT ALL

## 2025-05-12 ASSESSMENT — PAIN SCALES - GENERAL: PAINLEVEL_OUTOF10: 0-NO PAIN

## 2025-05-12 NOTE — PROGRESS NOTES
Michellenenaasha Mandujano presents for postop visit after abdominal myomectomy on 3/25/25    here for postop visit #2   - OK to resume normal activity without restriction  - return for annual gyn/preventive health visits    Fibroid uterus  - continues with NE acetate 10 mg daily.  Discussed possibility of decreasing to 5 mg daily if desires.  If bleeding recurs, can return to 10 mg daily.  - had previously discussed GnRh antagonist.  Still considering  will check with Dr. Sanchez.    - discussed NE acetate has not been tested as contraception, although theoretically should work similarly to contraceptive methods (as it inhibits ovulation).  Should she start on elagolix, would recommend another contraceptive method.      Chronic blood loss anemia  - s/p iv iron infusions.  Did not tolerate PO supplementation well.    - will check CBC to assess level of anemia, suspect improved    Pelvic floor PT  - continue with physical therapy at this time  - pelvic pain/high tone pelvic floor may be unrelated to fibroid uterus.  Could still benefit from ongoing therapty.          ---------------------------------------------  HPI    here for postop visit after abdominal myomectomy   doing well postop  pain controlled  No vaginal bleeding    Inquired about the need for continued pelvic floor PT    Not currently sexually active  Desires STI screening today        Vitals:    05/12/25 1140   BP: 124/82   Pulse: 101       Gen:  well-appearing, NAD  Chest:  breathing easily  Abd:  soft, non-tender throughout, no rebound/guarding/rigidity  Incision(s):  clean, dry, intact.    Ext:  non-tender calves bilaterally, no swelling  Psych:  AAOx3, normal affect.

## 2025-05-14 LAB
C TRACH RRNA SPEC QL NAA+PROBE: NOT DETECTED
N GONORRHOEA RRNA SPEC QL NAA+PROBE: NOT DETECTED
QUEST GC CT AMPLIFIED (ALWAYS MESSAGE): NORMAL
T VAGINALIS RRNA SPEC QL NAA+PROBE: NOT DETECTED

## 2025-05-17 LAB
ERYTHROCYTE [DISTWIDTH] IN BLOOD BY AUTOMATED COUNT: 13.9 % (ref 11–15)
FERRITIN SERPL-MCNC: 20 NG/ML (ref 16–154)
HCT VFR BLD AUTO: 41.3 % (ref 35–45)
HGB BLD-MCNC: 12.5 G/DL (ref 11.7–15.5)
HIV 1+2 AB+HIV1 P24 AG SERPL QL IA: NORMAL
IRON SATN MFR SERPL: 15 % (CALC) (ref 16–45)
IRON SERPL-MCNC: 69 MCG/DL (ref 40–190)
MCH RBC QN AUTO: 28.9 PG (ref 27–33)
MCHC RBC AUTO-ENTMCNC: 30.3 G/DL (ref 32–36)
MCV RBC AUTO: 95.6 FL (ref 80–100)
PLATELET # BLD AUTO: 302 THOUSAND/UL (ref 140–400)
PMV BLD REES-ECKER: 11 FL (ref 7.5–12.5)
RBC # BLD AUTO: 4.32 MILLION/UL (ref 3.8–5.1)
T PALLIDUM AB SER QL IA: NORMAL
TIBC SERPL-MCNC: 463 MCG/DL (CALC) (ref 250–450)
WBC # BLD AUTO: 3.9 THOUSAND/UL (ref 3.8–10.8)

## 2025-05-20 LAB
ERYTHROCYTE [DISTWIDTH] IN BLOOD BY AUTOMATED COUNT: 13.9 % (ref 11–15)
FERRITIN SERPL-MCNC: 20 NG/ML (ref 16–154)
HCT VFR BLD AUTO: 41.3 % (ref 35–45)
HGB BLD-MCNC: 12.5 G/DL (ref 11.7–15.5)
HIV 1+2 AB+HIV1 P24 AG SERPL QL IA: NORMAL
IRON SATN MFR SERPL: 15 % (CALC) (ref 16–45)
IRON SERPL-MCNC: 69 MCG/DL (ref 40–190)
MCH RBC QN AUTO: 28.9 PG (ref 27–33)
MCHC RBC AUTO-ENTMCNC: 30.3 G/DL (ref 32–36)
MCV RBC AUTO: 95.6 FL (ref 80–100)
PLATELET # BLD AUTO: 302 THOUSAND/UL (ref 140–400)
PMV BLD REES-ECKER: 11 FL (ref 7.5–12.5)
RBC # BLD AUTO: 4.32 MILLION/UL (ref 3.8–5.1)
T PALLIDUM AB SER QL IA: NEGATIVE
TIBC SERPL-MCNC: 463 MCG/DL (CALC) (ref 250–450)
WBC # BLD AUTO: 3.9 THOUSAND/UL (ref 3.8–10.8)

## 2025-05-30 PROCEDURE — RXMED WILLOW AMBULATORY MEDICATION CHARGE

## 2025-06-04 ENCOUNTER — PHARMACY VISIT (OUTPATIENT)
Dept: PHARMACY | Facility: CLINIC | Age: 25
End: 2025-06-04
Payer: MEDICAID

## 2025-06-19 DIAGNOSIS — R04.0 EPISTAXIS: ICD-10-CM

## 2025-06-19 PROCEDURE — RXMED WILLOW AMBULATORY MEDICATION CHARGE

## 2025-06-20 ENCOUNTER — PHARMACY VISIT (OUTPATIENT)
Dept: PHARMACY | Facility: CLINIC | Age: 25
End: 2025-06-20
Payer: MEDICAID

## 2025-06-23 ENCOUNTER — PHARMACY VISIT (OUTPATIENT)
Dept: PHARMACY | Facility: CLINIC | Age: 25
End: 2025-06-23
Payer: MEDICAID

## 2025-06-24 PROCEDURE — RXMED WILLOW AMBULATORY MEDICATION CHARGE

## 2025-06-24 RX ORDER — OXYMETAZOLINE HCL 0.05 G/100ML
2 SPRAY NASAL EVERY 12 HOURS PRN
Qty: 30 ML | Refills: 0 | Status: SHIPPED | OUTPATIENT
Start: 2025-06-24 | End: 2025-11-24

## 2025-06-27 ENCOUNTER — PHARMACY VISIT (OUTPATIENT)
Dept: PHARMACY | Facility: CLINIC | Age: 25
End: 2025-06-27
Payer: MEDICAID

## 2025-07-08 ENCOUNTER — TREATMENT (OUTPATIENT)
Dept: PHYSICAL THERAPY | Facility: CLINIC | Age: 25
End: 2025-07-08
Payer: COMMERCIAL

## 2025-07-08 DIAGNOSIS — R10.2 PELVIC PAIN IN FEMALE: ICD-10-CM

## 2025-07-08 PROCEDURE — 97110 THERAPEUTIC EXERCISES: CPT | Mod: GP | Performed by: PHYSICAL THERAPIST

## 2025-07-08 PROCEDURE — 97530 THERAPEUTIC ACTIVITIES: CPT | Mod: GP | Performed by: PHYSICAL THERAPIST

## 2025-07-08 NOTE — PROGRESS NOTES
"Physical Therapy    PELVIC FLOOR TREATMENT/RE-CHECK    Name: Clifford Mandujano \"Ariel\"  MRN: [id number]  : 2000  Today's Date: 25     Time Calculation  Start Time: 918  Stop Time: 1005  Time Calculation (min): 47 min       PT Therapeutic Procedures Time Entry  Therapeutic Exercise Time Entry: 28  Therapeutic Activity Time Entry: 10       Visit: 6  Insurance: [facility]  Auth: No   30 visits per year     Assessment:   Continue with flexibility work. Pt follows up after fibroid removal surgery; 25 fibroids were removed from uterus.   Did discuss having pt follow up with Dimitrios Diaz, CNP d/t continued external vaginal pain as well as skin changes     Plan:   Potential internal; pt will verbalize when ready  Half kneeling hip flexor stretch  Open books     Current Problem:  Problem List Items Addressed This Visit           ICD-10-CM    Pelvic pain in female R10.2         Subjective   Pt feeling a lot better since surgery  Her pain is improved; she is still having internal/vaginal pain with IC and does describe external pain skin sensitivity        Objective   Mod limitation IR R AROM vs L       TREATMENT  Therapeutic activity:  Review and instruction on scar massage to begin at home  Handout provided to pt     Therapeutic exercise:  Butterfly stretch x 1 min   Figure 4 stretch x 1 min ea   Happy baby stretch x 1 min   Lumbar rot x 10   Reverse clamshells x 10 ea   Cobra into child's pose x 5 with 3 second hold   Cat cow x 10   Squat with pelvic floor relaxation x 10   Half kneeling hip add stretch x 10 ea with 5 hold         Careplan Goals:  Problem: PT Problem       Goal: Pt will be independent in urgency control techniques for reduced nocturia to 1x/night for improved sleep quality           Goal: Pt will report reduced pelvic pain by 25% for improved ability to perform daily activities           Goal: Pt will increase pelvic floor flexibility demonstrated by ability to utilize tampon without pain      "      Goal: Pt will be independent in HEP for home maintenance            Layton Davis, PT

## 2025-07-20 PROCEDURE — RXMED WILLOW AMBULATORY MEDICATION CHARGE

## 2025-07-24 ENCOUNTER — TREATMENT (OUTPATIENT)
Dept: PHYSICAL THERAPY | Facility: CLINIC | Age: 25
End: 2025-07-24
Payer: COMMERCIAL

## 2025-07-24 DIAGNOSIS — R10.2 PELVIC PAIN IN FEMALE: ICD-10-CM

## 2025-07-24 PROCEDURE — 97110 THERAPEUTIC EXERCISES: CPT | Mod: GP | Performed by: PHYSICAL THERAPIST

## 2025-07-24 NOTE — PROGRESS NOTES
"Physical Therapy    PELVIC FLOOR TREATMENT    Name: Clifford Mandujano \"Ariel\"  MRN: [id number]  : 2000  Today's Date: 25     Time Calculation  Start Time: 1505  Stop Time: 1535  Time Calculation (min): 30 min       PT Therapeutic Procedures Time Entry  Therapeutic Exercise Time Entry:        Visit: 7  Insurance: [facility]  Auth: No   30 visits per year     Assessment:   Focused on rx that did not cause increase in pain symptoms. Lower abdomen and scar are still feeling good       Plan:   Potential internal; pt will verbalize when ready  Half kneeling hip flexor stretch  Open books       Current Problem:  Problem List Items Addressed This Visit           ICD-10-CM    Pelvic pain in female R10.2         Subjective   Pt continues to report external skin irritation with associated burning, pain, and ripping of the skin  She did get a follow up scheduled with OBGYN in 2 weeks     Pt was also able to schedule with Dimitrios Diaz and sees her in November       Objective   Mod limitation IR R AROM vs L       TREATMENT  Therapeutic exercise:  Lumbar rot x 10   Reverse clamshells x 10 ea   Cobra into child's pose x 5 with 3 second hold   Cat cow x 10       Not performed today:   Butterfly stretch x 1 min   Figure 4 stretch x 1 min ea   Happy baby stretch x 1 min   Squat with pelvic floor relaxation x 10   Half kneeling hip add stretch x 10 ea with 5 hold         Careplan Goals:  Problem: PT Problem       Goal: Pt will be independent in urgency control techniques for reduced nocturia to 1x/night for improved sleep quality           Goal: Pt will report reduced pelvic pain by 25% for improved ability to perform daily activities           Goal: Pt will increase pelvic floor flexibility demonstrated by ability to utilize tampon without pain           Goal: Pt will be independent in HEP for home maintenance            Layton Davis, PT  "

## 2025-07-25 ENCOUNTER — OFFICE VISIT (OUTPATIENT)
Dept: OBSTETRICS AND GYNECOLOGY | Facility: CLINIC | Age: 25
End: 2025-07-25
Payer: COMMERCIAL

## 2025-07-25 ENCOUNTER — PHARMACY VISIT (OUTPATIENT)
Dept: PHARMACY | Facility: CLINIC | Age: 25
End: 2025-07-25
Payer: MEDICAID

## 2025-07-25 VITALS
DIASTOLIC BLOOD PRESSURE: 84 MMHG | WEIGHT: 160 LBS | BODY MASS INDEX: 29.26 KG/M2 | HEART RATE: 80 BPM | SYSTOLIC BLOOD PRESSURE: 126 MMHG

## 2025-07-25 DIAGNOSIS — N76.1 CHRONIC VAGINITIS: ICD-10-CM

## 2025-07-25 DIAGNOSIS — N94.6 DYSMENORRHEA: ICD-10-CM

## 2025-07-25 DIAGNOSIS — N92.1 MENORRHAGIA WITH IRREGULAR CYCLE: ICD-10-CM

## 2025-07-25 DIAGNOSIS — L30.9 VULVAR DERMATITIS: Primary | ICD-10-CM

## 2025-07-25 PROCEDURE — 1036F TOBACCO NON-USER: CPT | Performed by: OBSTETRICS & GYNECOLOGY

## 2025-07-25 PROCEDURE — 99213 OFFICE O/P EST LOW 20 MIN: CPT | Performed by: OBSTETRICS & GYNECOLOGY

## 2025-07-25 RX ORDER — NORETHINDRONE 5 MG/1
10 TABLET ORAL DAILY
Qty: 90 TABLET | Refills: 3 | Status: SHIPPED | OUTPATIENT
Start: 2025-07-25 | End: 2026-07-25

## 2025-07-25 RX ORDER — CLOBETASOL PROPIONATE 0.5 MG/G
OINTMENT TOPICAL
Qty: 30 G | Refills: 1 | Status: SHIPPED | OUTPATIENT
Start: 2025-07-25

## 2025-07-25 ASSESSMENT — ENCOUNTER SYMPTOMS
CHILLS: 0
BACK PAIN: 0
ANOREXIA: 0
DYSURIA: 0
FREQUENCY: 0
VOMITING: 0
HEMATURIA: 0
FEVER: 0
SORE THROAT: 0
HEADACHES: 0
ABDOMINAL PAIN: 0
CONSTIPATION: 0
FLANK PAIN: 0
NAUSEA: 0
DIARRHEA: 0

## 2025-07-25 ASSESSMENT — PAIN SCALES - GENERAL: PAINLEVEL_OUTOF10: 0-NO PAIN

## 2025-07-25 NOTE — PROGRESS NOTES
Clifford Mandujano presents today with:             Preventive Health                      -----------------------------------------------------------------------  HPI    Presents today with  Vulvar irritation - feels like it is raw  States usually exacerbated prior to periods, or after or both    Reports associated itching  Mostly feels dry, and raw  Not painfuly unless it is touched or rubs against clothing    Goes away, not completely consistent    Just hyad a period   Symptoms started before bleeding started    Hadn't had it since Jan/Feb earlier this year    Before, was told yeast infection  Was given topical meds - made it worse    Started using vaseline, only helped while applied to the area  Currently using coristone 10                    Visit Vitals  /84   Pulse 80   Wt 72.6 kg (160 lb)   LMP 06/29/2025 (Exact Date)   BMI 29.26 kg/m²   OB Status Having periods   Smoking Status Never   BSA 1.78 m²       OBGyn Exam

## 2025-07-28 ENCOUNTER — PHARMACY VISIT (OUTPATIENT)
Dept: PHARMACY | Facility: CLINIC | Age: 25
End: 2025-07-28
Payer: MEDICAID

## 2025-07-28 PROCEDURE — RXMED WILLOW AMBULATORY MEDICATION CHARGE

## 2025-07-29 LAB — FUNGUS SPEC FUNGUS STN: ABNORMAL

## 2025-08-01 ENCOUNTER — TREATMENT (OUTPATIENT)
Dept: PHYSICAL THERAPY | Facility: CLINIC | Age: 25
End: 2025-08-01
Payer: COMMERCIAL

## 2025-08-01 DIAGNOSIS — R10.2 PELVIC PAIN IN FEMALE: ICD-10-CM

## 2025-08-01 PROCEDURE — 97110 THERAPEUTIC EXERCISES: CPT | Mod: GP | Performed by: PHYSICAL THERAPIST

## 2025-08-01 NOTE — PROGRESS NOTES
"Physical Therapy    PELVIC FLOOR TREATMENT    Name: Clifford Mandujano \"Ariel\"  MRN: [id number]  : 2000  Today's Date: 25     Time Calculation  Start Time: 1000  Stop Time: 1028  Time Calculation (min): 28 min       PT Therapeutic Procedures Time Entry  Therapeutic Exercise Time Entry:        Visit: 8  Insurance: [facility]  Auth: Required   30 visits per year   20V approted     Assessment:   Pt did well with flexibility; was able to complete more flexibility work d/t steroid helping with tenderness and skin irritation   Will continue to progress as tolerated     Plan:   Potential internal; pt will verbalize when ready  Half kneeling hip flexor stretch  Open books       Current Problem:  Problem List Items Addressed This Visit           ICD-10-CM    Pelvic pain in female R10.2         Subjective   Pt was able to get into OBGYN early   Followed up with Dr. Sequeira, was given clobetasol and that has been helping with rawness feeling; also discussed estrogen topically if steroid cream stops helping       Objective   Mod limitation IR R AROM vs L       TREATMENT  Therapeutic exercise:  Butterfly stretch x 1 min   Figure 4 stretch x 1 min ea   Lumbar rot x 10   Reverse clamshells x 10 ea   Cobra into child's pose x 5 with 3 second hold   Cat cow x 10   Squat with pelvic floor relaxation x 10       Not performed today:   Happy baby stretch x 1 min   Half kneeling hip add stretch x 10 ea with 5 hold       Careplan Goals:  Problem: PT Problem       Goal: Pt will be independent in urgency control techniques for reduced nocturia to 1x/night for improved sleep quality           Goal: Pt will report reduced pelvic pain by 25% for improved ability to perform daily activities           Goal: Pt will increase pelvic floor flexibility demonstrated by ability to utilize tampon without pain           Goal: Pt will be independent in HEP for home maintenance            Layton Davis, PT  "

## 2025-08-03 ENCOUNTER — RESULTS FOLLOW-UP (OUTPATIENT)
Dept: OBSTETRICS AND GYNECOLOGY | Facility: HOSPITAL | Age: 25
End: 2025-08-03
Payer: COMMERCIAL

## 2025-08-05 ENCOUNTER — APPOINTMENT (OUTPATIENT)
Dept: OBSTETRICS AND GYNECOLOGY | Facility: CLINIC | Age: 25
End: 2025-08-05
Payer: COMMERCIAL

## 2025-08-05 ENCOUNTER — APPOINTMENT (OUTPATIENT)
Dept: PHYSICAL THERAPY | Facility: CLINIC | Age: 25
End: 2025-08-05
Payer: COMMERCIAL

## 2025-08-12 DIAGNOSIS — B37.9 YEAST INFECTION: ICD-10-CM

## 2025-08-12 PROCEDURE — RXMED WILLOW AMBULATORY MEDICATION CHARGE

## 2025-08-12 RX ORDER — FLUCONAZOLE 150 MG/1
150 TABLET ORAL ONCE
Qty: 1 TABLET | Refills: 0 | Status: SHIPPED | OUTPATIENT
Start: 2025-08-12 | End: 2025-08-14

## 2025-08-13 ENCOUNTER — TREATMENT (OUTPATIENT)
Dept: PHYSICAL THERAPY | Facility: CLINIC | Age: 25
End: 2025-08-13
Payer: COMMERCIAL

## 2025-08-13 ENCOUNTER — PHARMACY VISIT (OUTPATIENT)
Dept: PHARMACY | Facility: CLINIC | Age: 25
End: 2025-08-13
Payer: MEDICAID

## 2025-08-13 DIAGNOSIS — R10.2 PELVIC PAIN IN FEMALE: ICD-10-CM

## 2025-08-13 PROCEDURE — 97110 THERAPEUTIC EXERCISES: CPT | Mod: GP | Performed by: PHYSICAL THERAPIST

## 2025-08-17 PROCEDURE — RXMED WILLOW AMBULATORY MEDICATION CHARGE

## 2025-08-20 ENCOUNTER — PHARMACY VISIT (OUTPATIENT)
Dept: PHARMACY | Facility: CLINIC | Age: 25
End: 2025-08-20
Payer: MEDICAID

## 2025-08-24 ASSESSMENT — ENCOUNTER SYMPTOMS
DIARRHEA: 0
FREQUENCY: 0
FEVER: 0
CHILLS: 0
VOMITING: 0
ABDOMINAL PAIN: 0
NAUSEA: 0
HEMATURIA: 0
FLANK PAIN: 0
CONSTIPATION: 0
SORE THROAT: 0
ANOREXIA: 0
HEADACHES: 0
BACK PAIN: 0
DYSURIA: 0

## 2025-08-26 ENCOUNTER — PHARMACY VISIT (OUTPATIENT)
Dept: PHARMACY | Facility: CLINIC | Age: 25
End: 2025-08-26
Payer: MEDICAID

## 2025-08-26 ENCOUNTER — APPOINTMENT (OUTPATIENT)
Dept: OBSTETRICS AND GYNECOLOGY | Facility: CLINIC | Age: 25
End: 2025-08-26
Payer: COMMERCIAL

## 2025-08-26 VITALS
WEIGHT: 164.5 LBS | HEART RATE: 96 BPM | SYSTOLIC BLOOD PRESSURE: 116 MMHG | BODY MASS INDEX: 30.09 KG/M2 | DIASTOLIC BLOOD PRESSURE: 74 MMHG

## 2025-08-26 DIAGNOSIS — Z11.3 ROUTINE SCREENING FOR STI (SEXUALLY TRANSMITTED INFECTION): ICD-10-CM

## 2025-08-26 DIAGNOSIS — N89.8 VAGINAL ITCHING: Primary | ICD-10-CM

## 2025-08-26 LAB — FUNGUS SPEC FUNGUS STN: ABNORMAL

## 2025-08-26 PROCEDURE — 99213 OFFICE O/P EST LOW 20 MIN: CPT

## 2025-08-26 PROCEDURE — 1036F TOBACCO NON-USER: CPT

## 2025-08-26 PROCEDURE — RXMED WILLOW AMBULATORY MEDICATION CHARGE

## 2025-08-26 PROCEDURE — 99214 OFFICE O/P EST MOD 30 MIN: CPT

## 2025-08-26 RX ORDER — CLOTRIMAZOLE AND BETAMETHASONE DIPROPIONATE 10; .64 MG/G; MG/G
1 CREAM TOPICAL 2 TIMES DAILY
Qty: 15 G | Refills: 0 | Status: SHIPPED | OUTPATIENT
Start: 2025-08-26 | End: 2025-09-25

## 2025-08-26 ASSESSMENT — ENCOUNTER SYMPTOMS
DIARRHEA: 0
RESPIRATORY NEGATIVE: 0
CHILLS: 0
ABDOMINAL PAIN: 0
NAUSEA: 0
EYES NEGATIVE: 0
MUSCULOSKELETAL NEGATIVE: 0
FEVER: 0
ENDOCRINE NEGATIVE: 0
DYSURIA: 0
VOMITING: 0
SORE THROAT: 0
BACK PAIN: 0
CONSTITUTIONAL NEGATIVE: 0
HEMATURIA: 0
GASTROINTESTINAL NEGATIVE: 0
FREQUENCY: 0
HEMATOLOGIC/LYMPHATIC NEGATIVE: 0
FLANK PAIN: 0
HEADACHES: 0
ALLERGIC/IMMUNOLOGIC NEGATIVE: 0
PSYCHIATRIC NEGATIVE: 0
CONSTIPATION: 0
CARDIOVASCULAR NEGATIVE: 0
NEUROLOGICAL NEGATIVE: 0

## 2025-08-26 ASSESSMENT — PATIENT HEALTH QUESTIONNAIRE - PHQ9
2. FEELING DOWN, DEPRESSED OR HOPELESS: NOT AT ALL
1. LITTLE INTEREST OR PLEASURE IN DOING THINGS: NOT AT ALL
SUM OF ALL RESPONSES TO PHQ9 QUESTIONS 1 AND 2: 0

## 2025-08-26 ASSESSMENT — PAIN SCALES - GENERAL: PAINLEVEL_OUTOF10: 0-NO PAIN

## 2025-08-27 LAB
BV SCORE VAG QL: NORMAL
C TRACH RRNA SPEC QL NAA+PROBE: NOT DETECTED
N GONORRHOEA RRNA SPEC QL NAA+PROBE: NOT DETECTED
QUEST GC CT AMPLIFIED (ALWAYS MESSAGE): NORMAL
T VAGINALIS RRNA SPEC QL NAA+PROBE: NOT DETECTED

## 2025-08-28 ENCOUNTER — PHARMACY VISIT (OUTPATIENT)
Dept: PHARMACY | Facility: CLINIC | Age: 25
End: 2025-08-28
Payer: MEDICAID

## 2025-08-28 PROCEDURE — RXMED WILLOW AMBULATORY MEDICATION CHARGE

## 2025-08-28 ASSESSMENT — ENCOUNTER SYMPTOMS
CONSTIPATION: 0
ABDOMINAL PAIN: 0
BACK PAIN: 0
VOMITING: 0
FEVER: 0
DYSURIA: 0
ANOREXIA: 0
FLANK PAIN: 0
FREQUENCY: 0
HEMATURIA: 0
NAUSEA: 0
HEADACHES: 0
DIARRHEA: 0
SORE THROAT: 0
CHILLS: 0

## 2025-08-29 ENCOUNTER — OFFICE VISIT (OUTPATIENT)
Dept: OBSTETRICS AND GYNECOLOGY | Facility: CLINIC | Age: 25
End: 2025-08-29
Payer: COMMERCIAL

## 2025-08-29 VITALS
WEIGHT: 164.2 LBS | BODY MASS INDEX: 30.22 KG/M2 | SYSTOLIC BLOOD PRESSURE: 118 MMHG | HEIGHT: 62 IN | DIASTOLIC BLOOD PRESSURE: 68 MMHG

## 2025-08-29 DIAGNOSIS — N76.1 CHRONIC VAGINITIS: Primary | ICD-10-CM

## 2025-08-29 PROCEDURE — 99213 OFFICE O/P EST LOW 20 MIN: CPT | Performed by: NURSE PRACTITIONER

## 2025-08-29 PROCEDURE — 3008F BODY MASS INDEX DOCD: CPT | Performed by: NURSE PRACTITIONER

## 2025-08-29 PROCEDURE — 1036F TOBACCO NON-USER: CPT | Performed by: NURSE PRACTITIONER

## 2025-08-29 ASSESSMENT — ENCOUNTER SYMPTOMS
HEMATOLOGIC/LYMPHATIC NEGATIVE: 0
FLANK PAIN: 0
CARDIOVASCULAR NEGATIVE: 0
SORE THROAT: 0
RESPIRATORY NEGATIVE: 0
DIARRHEA: 0
NEUROLOGICAL NEGATIVE: 0
ABDOMINAL PAIN: 0
ENDOCRINE NEGATIVE: 0
VOMITING: 0
CHILLS: 0
FREQUENCY: 0
ALLERGIC/IMMUNOLOGIC NEGATIVE: 0
NAUSEA: 0
BACK PAIN: 0
PSYCHIATRIC NEGATIVE: 0
MUSCULOSKELETAL NEGATIVE: 0
DYSURIA: 0
HEMATURIA: 0
FEVER: 0
CONSTITUTIONAL NEGATIVE: 0
CONSTIPATION: 0
EYES NEGATIVE: 0
GASTROINTESTINAL NEGATIVE: 0
ANOREXIA: 0
HEADACHES: 0

## 2025-08-29 ASSESSMENT — PAIN SCALES - GENERAL: PAINLEVEL_OUTOF10: 0-NO PAIN

## 2025-09-04 LAB — YEAST SPEC QL CULT: ABNORMAL

## 2025-09-05 DIAGNOSIS — B37.31 YEAST VAGINITIS: ICD-10-CM

## 2025-09-05 DIAGNOSIS — N76.1 CHRONIC VAGINITIS: Primary | ICD-10-CM

## 2025-09-05 PROCEDURE — RXMED WILLOW AMBULATORY MEDICATION CHARGE

## 2025-09-05 RX ORDER — FLUCONAZOLE 150 MG/1
TABLET ORAL
Qty: 12 TABLET | Refills: 1 | Status: SHIPPED | OUTPATIENT
Start: 2025-09-05

## 2025-09-06 ENCOUNTER — PHARMACY VISIT (OUTPATIENT)
Dept: PHARMACY | Facility: CLINIC | Age: 25
End: 2025-09-06
Payer: MEDICAID

## 2025-09-22 ENCOUNTER — APPOINTMENT (OUTPATIENT)
Dept: OPHTHALMOLOGY | Facility: CLINIC | Age: 25
End: 2025-09-22
Payer: COMMERCIAL

## 2025-11-24 ENCOUNTER — APPOINTMENT (OUTPATIENT)
Dept: OBSTETRICS AND GYNECOLOGY | Facility: CLINIC | Age: 25
End: 2025-11-24
Payer: COMMERCIAL

## (undated) DEVICE — TOWEL, SURGICAL, NEURO, O/R, 16 X 26, BLUE, STERILE

## (undated) DEVICE — DRESSING, ADHESIVE, ISLAND, TELFA, 4 X 10 IN

## (undated) DEVICE — SURGISLEEVE, WOUND PROTECTOR, LARGE 9-14CM

## (undated) DEVICE — SUTURE, VICRYL 0, TAPER POINT, CT-1 VIOLET 27 INCH

## (undated) DEVICE — STATLOCK, FOLEY SWIVEL, SILICONE TRICOT

## (undated) DEVICE — DRAPE, SHEET, LAPAROTOMY, W/ISO-BAC, W/ARMBOARD COVERS, 98 X 122 IN, DISPOSABLE, LF, STERILE

## (undated) DEVICE — DRAPE, SHEET, UTILITY, NON ABSORBENT, 18 X 26 IN, LF

## (undated) DEVICE — SYRINGE, 30 CC, LUER LOCK

## (undated) DEVICE — Device

## (undated) DEVICE — COVER, CART, 45 X 27 X 48 IN, CLEAR

## (undated) DEVICE — NEEDLE, HYPODERMIC, 23 GA X 1.5 IN

## (undated) DEVICE — MANIFOLD, 4 PORT NEPTUNE STANDARD

## (undated) DEVICE — SYRINGE, 60 CC, LUER LOCK, MONOJECT, W/O CAP, LF

## (undated) DEVICE — APPLICATOR, CHLORAPREP, W/ORANGE TINT, 26ML

## (undated) DEVICE — DRAPE, INCISE, ANTIMICROBIAL, IOBAN 2, LARGE, 17 X 23 IN, DISPOSABLE, STERILE

## (undated) DEVICE — MARKER, SKIN, RULER AND LABEL PACK, CUSTOM

## (undated) DEVICE — REST, HEAD, BAGEL, 9 IN

## (undated) DEVICE — SPONGE, BARRIER ADHESION, OXIDIZED CELLULOSE, INTERCEED, 3 X 4 IN

## (undated) DEVICE — SUTURE, PDSII, 1, TP-1, VIL, MONO, 48LP

## (undated) DEVICE — PREP TRAY, SKIN, DRY, W/GLOVES

## (undated) DEVICE — SUTURE, V-LOC, 2-0, 12IN, GS-21, GR 180 ABS